# Patient Record
Sex: FEMALE | Race: WHITE | NOT HISPANIC OR LATINO | Employment: OTHER | URBAN - METROPOLITAN AREA
[De-identification: names, ages, dates, MRNs, and addresses within clinical notes are randomized per-mention and may not be internally consistent; named-entity substitution may affect disease eponyms.]

---

## 2017-02-14 ENCOUNTER — OFFICE VISIT - RIVER FALLS (OUTPATIENT)
Dept: FAMILY MEDICINE | Facility: CLINIC | Age: 77
End: 2017-02-14

## 2017-02-14 ASSESSMENT — MIFFLIN-ST. JEOR: SCORE: 1179.26

## 2017-03-14 ENCOUNTER — OFFICE VISIT - RIVER FALLS (OUTPATIENT)
Dept: FAMILY MEDICINE | Facility: CLINIC | Age: 77
End: 2017-03-14

## 2017-09-06 ENCOUNTER — AMBULATORY - RIVER FALLS (OUTPATIENT)
Dept: FAMILY MEDICINE | Facility: CLINIC | Age: 77
End: 2017-09-06

## 2017-09-07 LAB
CHOLEST SERPL-MCNC: 273 MG/DL
CHOLEST/HDLC SERPL: 3.3 {RATIO}
CREAT SERPL-MCNC: 0.89 MG/DL (ref 0.6–0.93)
GLUCOSE BLD-MCNC: 102 MG/DL (ref 65–99)
HDLC SERPL-MCNC: 83 MG/DL
LDLC SERPL CALC-MCNC: 168 MG/DL
NONHDLC SERPL-MCNC: 190 MG/DL
TRIGL SERPL-MCNC: 102 MG/DL

## 2017-09-13 ENCOUNTER — OFFICE VISIT - RIVER FALLS (OUTPATIENT)
Dept: FAMILY MEDICINE | Facility: CLINIC | Age: 77
End: 2017-09-13

## 2017-09-13 ASSESSMENT — MIFFLIN-ST. JEOR: SCORE: 1194.12

## 2018-02-06 ENCOUNTER — AMBULATORY - RIVER FALLS (OUTPATIENT)
Dept: FAMILY MEDICINE | Facility: CLINIC | Age: 78
End: 2018-02-06

## 2018-02-07 LAB
CHOLEST SERPL-MCNC: 166 MG/DL
CHOLEST/HDLC SERPL: 2.2 {RATIO}
HDLC SERPL-MCNC: 75 MG/DL
LDLC SERPL CALC-MCNC: 77 MG/DL
NONHDLC SERPL-MCNC: 91 MG/DL
TRIGL SERPL-MCNC: 68 MG/DL

## 2018-02-13 ENCOUNTER — OFFICE VISIT - RIVER FALLS (OUTPATIENT)
Dept: FAMILY MEDICINE | Facility: CLINIC | Age: 78
End: 2018-02-13

## 2018-03-15 ENCOUNTER — OFFICE VISIT - RIVER FALLS (OUTPATIENT)
Dept: FAMILY MEDICINE | Facility: CLINIC | Age: 78
End: 2018-03-15

## 2018-03-29 ENCOUNTER — OFFICE VISIT - RIVER FALLS (OUTPATIENT)
Dept: FAMILY MEDICINE | Facility: CLINIC | Age: 78
End: 2018-03-29

## 2018-03-30 LAB
CREAT SERPL-MCNC: 0.98 MG/DL (ref 0.6–0.93)
GLUCOSE BLD-MCNC: 87 MG/DL (ref 65–99)

## 2018-05-03 ENCOUNTER — OFFICE VISIT - RIVER FALLS (OUTPATIENT)
Dept: FAMILY MEDICINE | Facility: CLINIC | Age: 78
End: 2018-05-03

## 2018-07-25 ENCOUNTER — OFFICE VISIT - RIVER FALLS (OUTPATIENT)
Dept: FAMILY MEDICINE | Facility: CLINIC | Age: 78
End: 2018-07-25

## 2018-09-19 ENCOUNTER — OFFICE VISIT - RIVER FALLS (OUTPATIENT)
Dept: FAMILY MEDICINE | Facility: CLINIC | Age: 78
End: 2018-09-19

## 2019-11-07 ENCOUNTER — AMBULATORY - RIVER FALLS (OUTPATIENT)
Dept: FAMILY MEDICINE | Facility: CLINIC | Age: 79
End: 2019-11-07

## 2019-11-08 LAB
BUN SERPL-MCNC: 14 MG/DL (ref 7–25)
BUN/CREAT RATIO - HISTORICAL: 14 (ref 6–22)
CALCIUM SERPL-MCNC: 9.4 MG/DL (ref 8.6–10.4)
CHLORIDE BLD-SCNC: 103 MMOL/L (ref 98–110)
CHOLEST SERPL-MCNC: 154 MG/DL
CHOLEST/HDLC SERPL: 2.3 {RATIO}
CO2 SERPL-SCNC: 27 MMOL/L (ref 20–32)
CREAT SERPL-MCNC: 0.99 MG/DL (ref 0.6–0.88)
EGFRCR SERPLBLD CKD-EPI 2021: 54 ML/MIN/1.73M2
ERYTHROCYTE [DISTWIDTH] IN BLOOD BY AUTOMATED COUNT: 12 % (ref 11–15)
GLUCOSE BLD-MCNC: 90 MG/DL (ref 65–99)
HCT VFR BLD AUTO: 35.6 % (ref 35–45)
HDLC SERPL-MCNC: 67 MG/DL
HGB BLD-MCNC: 11.9 GM/DL (ref 11.7–15.5)
LDLC SERPL CALC-MCNC: 74 MG/DL
MCH RBC QN AUTO: 31 PG (ref 27–33)
MCHC RBC AUTO-ENTMCNC: 33.4 GM/DL (ref 32–36)
MCV RBC AUTO: 92.7 FL (ref 80–100)
NONHDLC SERPL-MCNC: 87 MG/DL
PLATELET # BLD AUTO: 307 10*3/UL (ref 140–400)
PMV BLD: 10 FL (ref 7.5–12.5)
POTASSIUM BLD-SCNC: 4.2 MMOL/L (ref 3.5–5.3)
RBC # BLD AUTO: 3.84 10*6/UL (ref 3.8–5.1)
SODIUM SERPL-SCNC: 137 MMOL/L (ref 135–146)
TRIGL SERPL-MCNC: 45 MG/DL
WBC # BLD AUTO: 6.3 10*3/UL (ref 3.8–10.8)

## 2019-11-19 ENCOUNTER — OFFICE VISIT - RIVER FALLS (OUTPATIENT)
Dept: FAMILY MEDICINE | Facility: CLINIC | Age: 79
End: 2019-11-19

## 2019-12-05 ENCOUNTER — OFFICE VISIT - RIVER FALLS (OUTPATIENT)
Dept: FAMILY MEDICINE | Facility: CLINIC | Age: 79
End: 2019-12-05

## 2020-09-30 ENCOUNTER — AMBULATORY - RIVER FALLS (OUTPATIENT)
Dept: FAMILY MEDICINE | Facility: CLINIC | Age: 80
End: 2020-09-30
Payer: COMMERCIAL

## 2020-11-20 ENCOUNTER — COMMUNICATION - RIVER FALLS (OUTPATIENT)
Dept: FAMILY MEDICINE | Facility: CLINIC | Age: 80
End: 2020-11-20
Payer: COMMERCIAL

## 2020-11-25 ENCOUNTER — AMBULATORY - RIVER FALLS (OUTPATIENT)
Dept: FAMILY MEDICINE | Facility: CLINIC | Age: 80
End: 2020-11-25
Payer: COMMERCIAL

## 2020-11-26 LAB
BUN SERPL-MCNC: 17 MG/DL (ref 7–25)
BUN/CREAT RATIO - HISTORICAL: 18 (ref 6–22)
CALCIUM SERPL-MCNC: 9.6 MG/DL (ref 8.6–10.4)
CHLORIDE BLD-SCNC: 102 MMOL/L (ref 98–110)
CHOLEST SERPL-MCNC: 177 MG/DL
CHOLEST/HDLC SERPL: 2.6 {RATIO}
CO2 SERPL-SCNC: 27 MMOL/L (ref 20–32)
CREAT SERPL-MCNC: 0.94 MG/DL (ref 0.6–0.88)
EGFRCR SERPLBLD CKD-EPI 2021: 57 ML/MIN/1.73M2
ERYTHROCYTE [DISTWIDTH] IN BLOOD BY AUTOMATED COUNT: 12.1 % (ref 11–15)
GLUCOSE BLD-MCNC: 97 MG/DL (ref 65–99)
HCT VFR BLD AUTO: 38.3 % (ref 35–45)
HDLC SERPL-MCNC: 67 MG/DL
HGB BLD-MCNC: 13.4 GM/DL (ref 11.7–15.5)
LDLC SERPL CALC-MCNC: 93 MG/DL
MCH RBC QN AUTO: 32.4 PG (ref 27–33)
MCHC RBC AUTO-ENTMCNC: 35 GM/DL (ref 32–36)
MCV RBC AUTO: 92.5 FL (ref 80–100)
NONHDLC SERPL-MCNC: 110 MG/DL
PLATELET # BLD AUTO: 287 10*3/UL (ref 140–400)
PMV BLD: 10.7 FL (ref 7.5–12.5)
POTASSIUM BLD-SCNC: 4.3 MMOL/L (ref 3.5–5.3)
RBC # BLD AUTO: 4.14 10*6/UL (ref 3.8–5.1)
SODIUM SERPL-SCNC: 136 MMOL/L (ref 135–146)
TRIGL SERPL-MCNC: 77 MG/DL
WBC # BLD AUTO: 7.3 10*3/UL (ref 3.8–10.8)

## 2020-11-27 ENCOUNTER — COMMUNICATION - RIVER FALLS (OUTPATIENT)
Dept: FAMILY MEDICINE | Facility: CLINIC | Age: 80
End: 2020-11-27
Payer: COMMERCIAL

## 2020-12-03 ENCOUNTER — TRANSFERRED RECORDS (OUTPATIENT)
Dept: MULTI SPECIALTY CLINIC | Facility: CLINIC | Age: 80
End: 2020-12-03
Payer: COMMERCIAL

## 2020-12-03 ENCOUNTER — OFFICE VISIT - RIVER FALLS (OUTPATIENT)
Dept: FAMILY MEDICINE | Facility: CLINIC | Age: 80
End: 2020-12-03
Payer: COMMERCIAL

## 2020-12-03 ASSESSMENT — MIFFLIN-ST. JEOR: SCORE: 1183.23

## 2021-01-14 ENCOUNTER — OFFICE VISIT - RIVER FALLS (OUTPATIENT)
Dept: FAMILY MEDICINE | Facility: CLINIC | Age: 81
End: 2021-01-14
Payer: COMMERCIAL

## 2021-01-14 ASSESSMENT — MIFFLIN-ST. JEOR: SCORE: 1192.3

## 2021-06-01 ENCOUNTER — COMMUNICATION - RIVER FALLS (OUTPATIENT)
Dept: FAMILY MEDICINE | Facility: CLINIC | Age: 81
End: 2021-06-01
Payer: COMMERCIAL

## 2021-06-02 ENCOUNTER — OFFICE VISIT - RIVER FALLS (OUTPATIENT)
Dept: FAMILY MEDICINE | Facility: CLINIC | Age: 81
End: 2021-06-02
Payer: COMMERCIAL

## 2021-08-20 ENCOUNTER — COMMUNICATION - RIVER FALLS (OUTPATIENT)
Dept: FAMILY MEDICINE | Facility: CLINIC | Age: 81
End: 2021-08-20
Payer: COMMERCIAL

## 2021-09-01 ENCOUNTER — AMBULATORY - RIVER FALLS (OUTPATIENT)
Dept: FAMILY MEDICINE | Facility: CLINIC | Age: 81
End: 2021-09-01
Payer: COMMERCIAL

## 2021-09-02 LAB
BUN SERPL-MCNC: 18 MG/DL (ref 7–25)
BUN/CREAT RATIO - HISTORICAL: 20 (ref 6–22)
CALCIUM SERPL-MCNC: 9.8 MG/DL (ref 8.6–10.4)
CHLORIDE BLD-SCNC: 103 MMOL/L (ref 98–110)
CHOLEST SERPL-MCNC: 177 MG/DL
CHOLEST/HDLC SERPL: 2.5 {RATIO}
CO2 SERPL-SCNC: 27 MMOL/L (ref 20–32)
CREAT SERPL-MCNC: 0.89 MG/DL (ref 0.6–0.88)
EGFRCR SERPLBLD CKD-EPI 2021: 61 ML/MIN/1.73M2
ERYTHROCYTE [DISTWIDTH] IN BLOOD BY AUTOMATED COUNT: 12.3 % (ref 11–15)
GLUCOSE BLD-MCNC: 94 MG/DL (ref 65–99)
HCT VFR BLD AUTO: 38.4 % (ref 35–45)
HDLC SERPL-MCNC: 71 MG/DL
HGB BLD-MCNC: 12.7 GM/DL (ref 11.7–15.5)
LDLC SERPL CALC-MCNC: 91 MG/DL
MCH RBC QN AUTO: 31.2 PG (ref 27–33)
MCHC RBC AUTO-ENTMCNC: 33.1 GM/DL (ref 32–36)
MCV RBC AUTO: 94.3 FL (ref 80–100)
NONHDLC SERPL-MCNC: 106 MG/DL
PLATELET # BLD AUTO: 315 10*3/UL (ref 140–400)
PMV BLD: 10.2 FL (ref 7.5–12.5)
POTASSIUM BLD-SCNC: 4.9 MMOL/L (ref 3.5–5.3)
RBC # BLD AUTO: 4.07 10*6/UL (ref 3.8–5.1)
SODIUM SERPL-SCNC: 137 MMOL/L (ref 135–146)
TRIGL SERPL-MCNC: 61 MG/DL
WBC # BLD AUTO: 7.4 10*3/UL (ref 3.8–10.8)

## 2021-09-07 ENCOUNTER — COMMUNICATION - RIVER FALLS (OUTPATIENT)
Dept: FAMILY MEDICINE | Facility: CLINIC | Age: 81
End: 2021-09-07
Payer: COMMERCIAL

## 2021-09-10 ENCOUNTER — OFFICE VISIT - RIVER FALLS (OUTPATIENT)
Dept: FAMILY MEDICINE | Facility: CLINIC | Age: 81
End: 2021-09-10
Payer: COMMERCIAL

## 2021-09-10 ASSESSMENT — MIFFLIN-ST. JEOR: SCORE: 1166.22

## 2021-09-13 ENCOUNTER — COMMUNICATION - RIVER FALLS (OUTPATIENT)
Dept: FAMILY MEDICINE | Facility: CLINIC | Age: 81
End: 2021-09-13
Payer: COMMERCIAL

## 2021-09-27 ENCOUNTER — COMMUNICATION - RIVER FALLS (OUTPATIENT)
Dept: FAMILY MEDICINE | Facility: CLINIC | Age: 81
End: 2021-09-27
Payer: COMMERCIAL

## 2021-10-04 ENCOUNTER — AMBULATORY - RIVER FALLS (OUTPATIENT)
Dept: FAMILY MEDICINE | Facility: CLINIC | Age: 81
End: 2021-10-04
Payer: COMMERCIAL

## 2021-11-11 ENCOUNTER — AMBULATORY - RIVER FALLS (OUTPATIENT)
Dept: FAMILY MEDICINE | Facility: CLINIC | Age: 81
End: 2021-11-11
Payer: COMMERCIAL

## 2021-12-17 ENCOUNTER — COMMUNICATION - RIVER FALLS (OUTPATIENT)
Dept: FAMILY MEDICINE | Facility: CLINIC | Age: 81
End: 2021-12-17
Payer: COMMERCIAL

## 2021-12-17 ENCOUNTER — OFFICE VISIT - RIVER FALLS (OUTPATIENT)
Dept: FAMILY MEDICINE | Facility: CLINIC | Age: 81
End: 2021-12-17
Payer: COMMERCIAL

## 2021-12-17 ASSESSMENT — MIFFLIN-ST. JEOR: SCORE: 1170.76

## 2021-12-22 ENCOUNTER — OFFICE VISIT - RIVER FALLS (OUTPATIENT)
Dept: FAMILY MEDICINE | Facility: CLINIC | Age: 81
End: 2021-12-22
Payer: COMMERCIAL

## 2021-12-22 ASSESSMENT — MIFFLIN-ST. JEOR: SCORE: 1179.37

## 2021-12-27 ENCOUNTER — COMMUNICATION - RIVER FALLS (OUTPATIENT)
Dept: FAMILY MEDICINE | Facility: CLINIC | Age: 81
End: 2021-12-27
Payer: COMMERCIAL

## 2022-01-19 ENCOUNTER — OFFICE VISIT - RIVER FALLS (OUTPATIENT)
Dept: FAMILY MEDICINE | Facility: CLINIC | Age: 82
End: 2022-01-19
Payer: COMMERCIAL

## 2022-02-11 VITALS
HEIGHT: 64 IN | BODY MASS INDEX: 27.49 KG/M2 | HEART RATE: 61 BPM | DIASTOLIC BLOOD PRESSURE: 80 MMHG | TEMPERATURE: 98 F | RESPIRATION RATE: 14 BRPM | WEIGHT: 161 LBS | OXYGEN SATURATION: 97 % | SYSTOLIC BLOOD PRESSURE: 144 MMHG

## 2022-02-11 VITALS
DIASTOLIC BLOOD PRESSURE: 70 MMHG | HEART RATE: 78 BPM | DIASTOLIC BLOOD PRESSURE: 70 MMHG | SYSTOLIC BLOOD PRESSURE: 140 MMHG | TEMPERATURE: 98 F | WEIGHT: 164.4 LBS | HEART RATE: 64 BPM | SYSTOLIC BLOOD PRESSURE: 150 MMHG | SYSTOLIC BLOOD PRESSURE: 124 MMHG | TEMPERATURE: 97.3 F | DIASTOLIC BLOOD PRESSURE: 68 MMHG | WEIGHT: 163.8 LBS | HEART RATE: 35 BPM

## 2022-02-11 VITALS
BODY MASS INDEX: 27.31 KG/M2 | BODY MASS INDEX: 27.64 KG/M2 | SYSTOLIC BLOOD PRESSURE: 145 MMHG | HEIGHT: 64 IN | WEIGHT: 160 LBS | DIASTOLIC BLOOD PRESSURE: 119 MMHG | HEART RATE: 51 BPM | WEIGHT: 161.9 LBS | HEIGHT: 64 IN | OXYGEN SATURATION: 95 % | TEMPERATURE: 97.4 F | SYSTOLIC BLOOD PRESSURE: 226 MMHG | DIASTOLIC BLOOD PRESSURE: 78 MMHG | RESPIRATION RATE: 20 BRPM | OXYGEN SATURATION: 97 % | HEART RATE: 73 BPM

## 2022-02-11 VITALS
TEMPERATURE: 97.2 F | OXYGEN SATURATION: 99 % | DIASTOLIC BLOOD PRESSURE: 72 MMHG | HEART RATE: 76 BPM | WEIGHT: 160.4 LBS | SYSTOLIC BLOOD PRESSURE: 148 MMHG | BODY MASS INDEX: 27.11 KG/M2

## 2022-02-11 VITALS
HEART RATE: 64 BPM | WEIGHT: 161 LBS | DIASTOLIC BLOOD PRESSURE: 68 MMHG | TEMPERATURE: 96.5 F | HEIGHT: 65 IN | HEIGHT: 65 IN | BODY MASS INDEX: 26.82 KG/M2 | DIASTOLIC BLOOD PRESSURE: 72 MMHG | TEMPERATURE: 96.7 F | HEART RATE: 64 BPM | OXYGEN SATURATION: 95 % | BODY MASS INDEX: 27.16 KG/M2 | SYSTOLIC BLOOD PRESSURE: 172 MMHG | WEIGHT: 163 LBS | SYSTOLIC BLOOD PRESSURE: 170 MMHG | OXYGEN SATURATION: 96 %

## 2022-02-11 VITALS
SYSTOLIC BLOOD PRESSURE: 120 MMHG | OXYGEN SATURATION: 94 % | HEART RATE: 65 BPM | TEMPERATURE: 97.7 F | DIASTOLIC BLOOD PRESSURE: 78 MMHG

## 2022-02-11 VITALS
HEIGHT: 65 IN | DIASTOLIC BLOOD PRESSURE: 72 MMHG | BODY MASS INDEX: 27.22 KG/M2 | TEMPERATURE: 97.6 F | SYSTOLIC BLOOD PRESSURE: 144 MMHG | WEIGHT: 163.4 LBS | HEART RATE: 64 BPM

## 2022-02-11 VITALS
SYSTOLIC BLOOD PRESSURE: 166 MMHG | TEMPERATURE: 97.9 F | HEART RATE: 76 BPM | WEIGHT: 163 LBS | DIASTOLIC BLOOD PRESSURE: 82 MMHG

## 2022-02-11 VITALS
HEIGHT: 64 IN | SYSTOLIC BLOOD PRESSURE: 136 MMHG | TEMPERATURE: 97.1 F | HEART RATE: 65 BPM | BODY MASS INDEX: 27.14 KG/M2 | OXYGEN SATURATION: 96 % | DIASTOLIC BLOOD PRESSURE: 88 MMHG | WEIGHT: 159 LBS

## 2022-02-11 VITALS
DIASTOLIC BLOOD PRESSURE: 80 MMHG | BODY MASS INDEX: 27.08 KG/M2 | WEIGHT: 159 LBS | TEMPERATURE: 97.6 F | HEART RATE: 64 BPM | SYSTOLIC BLOOD PRESSURE: 180 MMHG

## 2022-02-16 NOTE — PROGRESS NOTES
"   Patient:   SURJIT BOURNE            MRN: [id number]            FIN: [id number]               Age:   81 years     Sex:  Female     :  1940   Associated Diagnoses:   Iron deficiency anemia; Esophageal Reflux Disease; Osteoporosis; Hypertension   Author:   Mg Aguiar MD      Visit Information      Date of Service: 2021 12:49 pm  Performing Location: [facility]  Encounter#: [id number]      Primary Care Provider (PCP):  Mg Aguiar MD    NPI# [id number]      Referring Provider:  Mg Aguiar MD    NPI# [id number]      Chief Complaint   2021 1:03 PM CST   1.  f/u HTN/\"spells\" - describes having difficulty walking straight at times, difficutly ocntrollijng her body, visutal disturbance              Additional Information:No additional information recorded during visit.   Chief complaint and symptoms as noted above and confirmed with patient.  Recent lab and diagnostic studies reviewed with patient      History of Present Illness     12/3/2020: Surjit presents for annual wellness evaluation.  She and Rohan are doing well.  They are not planning on traveling to [place] this year because of the pandemic.  She and her family have been very careful with isolation.  She has been wearing a mask in all indoor occasions.  She and Himanshu have been healthy.  Blood pressure remains consistently high.  She is hesitant about introduction of medications though after further conversation is willing to trial further medicine.  She did not receive Reclast this year though would be willing to do it now.  21:Jo Ann returns to clinic for blood pressure follow-up.  At her last visit was started on losartan 50 mg daily due to persistent uncontrolled hypertension.  She has tolerated the medications without any real issues.  She does monitor her home blood pressures.  States before taking the medication, and her home systolic blood pressures were generally in the 150s.  She brings in a log showing home " blood pressures now primarily in the 120s and 130s.  Heart rates per home recordings generally in the 60s.  Some concern with automated blood pressure cuff reporting a pulse rate of 35.  Complaints of persistent cough, questions of its food related.    12/22/2021: Jo Ann returns for follow-up related to hypertension concerns.  Seen in urgent care clinic this past week related to acute onset ataxia and dizziness base complaints.  In clinic EKG not demonstrating any acute LV strain or arrhythmia.  Blood pressures notably high in the 190s over 90s.  Introduced to atenolol 25 mg daily.  She states the following day her ataxia complaints spontaneously resolved and felt more like baseline.  Has not had any development of similar episodes since.  Does not describe any focal neurological deficits.  Denies any weakness, paresthesias, visual changes.  No medication changes.  Denies any headache.             Review of Systems   Constitutional:  No fever, No chills, No weakness, No fatigue.    Eye:  Negative except as documented in history of present illness.    Ear/Nose/Mouth/Throat:  No nasal congestion.    Respiratory:  No shortness of breath, No cough.    Cardiovascular:  No chest pain, No palpitations, No peripheral edema, No syncope.    Gastrointestinal:  No nausea, No vomiting, No abdominal pain.    Genitourinary:  No dysuria, No hematuria.    Hematology/Lymphatics:  Negative except as documented in history of present illness.    Endocrine   Immunologic   Musculoskeletal:  No joint pain, No muscle pain.    Integumentary   Neurologic:  Alert and oriented X4, Abnormal balance, No confusion, No numbness, No tingling, No headache.       Health Status   Allergies:    Allergic Reactions (Selected)  Severity Not Documented  Acetaminophen (Nausea and vomiting)  Aciphex (Upset stomach)  Codeine (Vomit)  HYDROcodone (Nausea and vomiting)  TraMADol (Vomit)   Medications:  (Selected)    Prescriptions  Prescribed  atenolol-chlorthalidone 50 mg-25 mg oral tablet: 1 tab(s), Oral, daily, # 90 tab(s), 3 Refill(s), Type: Maintenance, Pharmacy: [facility] [id number] [facility], 1 tab(s) Oral daily, 64, in, 12/22/21 13:03:00 CST, Height Measured, 161.9, lb, 12/22/21 13:03:00 CST, Weight Measured  atorvastatin 20 mg oral tablet: = 1 tab(s), Oral, daily, # 90 tab(s), 2 Refill(s), Type: Maintenance, Pharmacy: [place] [facility] [id number] [facility], Pt due for annual med check for further refills., 1 tab(s) Oral daily, 64, in, 09/10/21 11:20:00 CDT, Height Measured, 159, lb, 09/10/21 11:20:00 CDT,...  calcium carbonate 500 mg (200 mg elemental calcium) oral tablet, chewable: = 1 tab(s) ( 500 mg ), Chewed, bid, # 120 tab(s), 0 Refill(s), Type: Maintenance, OTC (Rx)  losartan 50 mg oral tablet: = 1 tab(s) ( 50 mg ), Oral, daily, # 90 tab(s), 2 Refill(s), Type: Maintenance, Pharmacy: [facility] [id number] [facility], 1 tab(s) Oral daily, 64, in, 09/10/21 11:20:00 CDT, Height Measured, 159, lb, 09/10/21 11:20:00 CDT, Weight Measured  omeprazole 20 mg oral delayed release capsule: = 1 cap(s) ( 20 mg ), Oral, daily, # 90 cap(s), 2 Refill(s), Type: Maintenance, Pharmacy: [facility] [id number] [facility], keep on file and pt will notify when needed, 1 cap(s) Oral daily, 64, in, 09/10/21 11:20:00 CDT, Height Measured, 159, lb, 09/10/21 11:20:00...  Documented Medications  Documented  Reclast 5 mg/100 mL intravenous solution: ( 5 mg ), IV, once, Instructions: annually, 0 Refill(s), Type: Maintenance  ibuprofen: po, prn, PRN: as needed for pain, 0 Refill(s), Type: Maintenance,    Medications          *denotes recorded medication          atenolol-chlorthalidone 50 mg-25 mg oral tablet: 1 tab(s), Oral, daily, 90 tab(s), 3 Refill(s).          atorvastatin 20 mg oral tablet: 1 tab(s), Oral, daily, 90 tab(s), 2 Refill(s).          calcium carbonate 500 mg (200 mg elemental calcium) oral tablet, chewable: 500 mg, 1 tab(s), Chewed, bid, 120 tab(s), 0 Refill(s).          *ibuprofen:  po, prn, PRN: as needed for pain, 0 Refill(s).          losartan 50 mg oral tablet: 50 mg, 1 tab(s), Oral, daily, 90 tab(s), 2 Refill(s).          omeprazole 20 mg oral delayed release capsule: 20 mg, 1 cap(s), Oral, daily, 90 cap(s), 2 Refill(s).          *Reclast 5 mg/100 mL intravenous solution: 5 mg, IV, once, annually, 0 Refill(s).       Problem list:    All Problems  Blood pressure elevated / SNOMED CT 069354721 / Confirmed  Esophageal reflux disease / SNOMED CT 727374827 / Confirmed  History of chickenpox / SNOMED CT 318302776 / Confirmed  Hypertension / SNOMED CT 6522734504 / Confirmed  Iron deficiency anemia / SNOMED CT 676751179 / Confirmed  Obesity / SNOMED CT 4705982680 / Probable  OA (osteoarthritis) / SNOMED CT 3687814330 / Confirmed  Osteoporosis / SNOMED CT 391050915 / Confirmed  Pure hypercholesterolemia / SNOMED CT 302212237 / Confirmed  Inactive: Thyroid nodule / SNOMED CT 813549550  Resolved: Acute lower UTI (urinary tract infection) / SNOMED CT 1798269290  Resolved: Bilateral carpal tunnel syndrome / SNOMED CT 56307353  Resolved: Head trauma / SNOMED CT 173167735  Resolved: Pregnancy / SNOMED CT 850024469  Resolved: Pregnancy / SNOMED CT 425480073  Resolved: Pregnancy / SNOMED CT 503250148  Resolved: Seizures / SNOMED CT 954975101  Canceled: Obesity / ICD-9-.00      Histories   Past Medical History:    Active  OA (osteoarthritis) (9140335201): Onset in 2006 at 66 years.  Comments:  12/19/2012 CST 1:20 PM CST - Gina Padilla  Right knee  Esophageal reflux disease (575680583)  Obesity (1718531348)  Blood pressure elevated (107263283)  Iron deficiency anemia (778157800)  History of chickenpox (292139170)  Resolved  Seizures (909539198): Onset in 1960 at 20 years.  Resolved.  Comments:  12/20/2012 CST 10:13 AM CST - Davina Santana  Of unknown origin.  Acute lower UTI (urinary tract infection) (5243226634):  Resolved.  Bilateral carpal tunnel syndrome (24047591):  Resolved.  Head trauma  (152075189):  Resolved.  Comments:  2014 CST 3:38 PM DENICE BernsteinDavina pena  Fell back on ice with head wound.  No sequelae.  Pregnancy (053765838):  Resolved in  at 26 years.  Pregnancy (318632824):  Resolved in  at 28 years.  Pregnancy (760922967):  Resolved in  at 30 years.   Family History:    Cancer  Father ()  Comments:  2012 10:08 AM DENICE Santana  Davina  Prostate cancer.    2010 1:33 PM Angelique Montemayor  Stomach, Prostate cancer  Asthma  Father ()  Breast cancer  Mother ()  Sister (Guadalupe)  Sister (Nereida)  Hypertension  Mother ()  High blood pressure  Sister (Guadalupe)  Sister (Nereida)  Arthritis  Father ()  Mother ()  Congestive heart failure  Mother ()  Comments:  2012 10:18 AM DENICE Santana Davina  Cunningham's lung.  Seasonal allergy  Daughter  Son (Faraz)     Procedure history:    Release of trigger finger (209111628) in the month of 2020 at 79 Years.  Comments:  12/3/2020 10:43 AM CST - Chelo Buchanan CMA  left finger  Colonoscopy (220064509) in the month of 10/2014 at 73 Years.  Comments:  2016 12:58 PM CDT - Chelo Buchanan CMA  few diverticula, otherwise normal  Upper GI endoscopy (2689699717) in the month of 10/2014 at 73 Years.  Bone density scan (012405856) in the month of 2009 at 68 Years.  Cataract extraction (62948056) in  at 66 Years.  Comments:  2010 1:30 PM Angelique Montemayor 2006. R. 2007  Colonoscopy (081955304) on 10/6/2005 at 64 Years.  Comments:  2012 10:03 AM CST Chelo Storm CMA  Normal - no polyps/no masses  Excision left cheek. on 2005 at 64 Years.  excision of thyroid nodule in the month of 2001 at 60 Years.  Vaginal hysterectomy (264941859) in the month of 1998 at 58 Years.  Cholecystectomy (06602017) in  at 42 Years.  Childbirth (3851449867).  Comments:  2010 1:31 PM DENICE - Angelique Singleton 3, Para 3  Blepharoplasty of upper eyelid  "([id number]).  Comments:  12/19/2012 1:19 PM CST - Ozzyalexa Gina  Ptosis  Hysterectomy (436015940).  Comments:  11/18/2014 3:37 PM CST - Davina Santana  With one ovary remaining.  Release of trigger thumb (173585174).  Comments:  9/13/2017 1:04 PM CDT - Dewayne SCHILLINGChelo  right   Social History:        Electronic Cigarette/Vaping Assessment            Electronic Cigarette Use: Never.      Alcohol Assessment            Past      Tobacco Assessment            Never (less than 100 in lifetime)      Substance Abuse Assessment            Never      Employment and Education Assessment            Work/School description: teacher.            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      Home and Environment Assessment            Marital status: .  Spouse/Partner name: Himanshu.      Nutrition and Health Assessment            Type of diet: Regular.      Exercise and Physical Activity Assessment                     Comments:                      09/14/2017 - Chelo Gonzales                     Walking, biking, \"some leg and back weights\".  Five times per week.      Other Assessment            Marital status,         Physical Examination   vital signs stable, as noted above   Vital Signs   12/22/2021 1:11 PM CST Systolic Blood Pressure 205 mmHg  HI    Diastolic Blood Pressure 82 mmHg  HI    Mean Arterial Pressure 123 mmHg   12/22/2021 1:09 PM CST Systolic Blood Pressure 204 mmHg  HI    Systolic Blood Pressure 205 mmHg  HI    Diastolic Blood Pressure 86 mmHg  HI    Diastolic Blood Pressure 82 mmHg  HI    Mean Arterial Pressure 125 mmHg    Mean Arterial Pressure 123 mmHg    BP Site Left arm    BP Site Right arm   12/22/2021 1:03 PM CST Temperature Tympanic 97.4 DegF  LOW    Peripheral Pulse Rate 60 bpm    Systolic Blood Pressure 214 mmHg  HI    Diastolic Blood Pressure 84 mmHg  HI    Mean Arterial Pressure 127 mmHg    Oxygen Saturation 95 %      Measurements from flowsheet : Measurements "   12/22/2021 1:03 PM CST Height Measured - Standard 64 in    Height/Length Estimated 64.5 in    Weight Measured - Standard 161.9 lb    BSA 1.82 m2    Body Mass Index 27.79 kg/m2  HI      General:  Alert and oriented, No acute distress.    Eye:  Pupils are equal, round and reactive to light, Extraocular movements are intact, Normal conjunctiva.    HENT:  No pharyngeal erythema.    Respiratory:  Lungs are clear to auscultation, Respirations are non-labored, Breath sounds are equal.    Cardiovascular:  Normal rate, Regular rhythm, No murmur, regularly irregular rhythm.    Gastrointestinal:  Soft, Non-tender, Non-distended.    Neurologic:  Alert, Oriented, Normal sensory, Cranial Nerves II-XII are grossly intact.    Cognition and Speech:  Oriented, Speech clear and coherent.    Psychiatric:  Appropriate mood & affect.       Review / Management   Results review      Impression and Plan   Diagnosis     Iron deficiency anemia (BND51-RB D50.0).     Esophageal Reflux Disease (PZZ93-XZ K21.9).     Osteoporosis (LPB70-OB M81.0).     Hypertension (XSQ78-TN I10).         .) hypertension, uncontrolled (spurious home readings based on radial cuff)  current antihypertensive regimen: losartan 50mg qhs, Toprol XL 25mg daily (recently started)  regimen changes: stop metoprolol and replace with atenolol/chlorthalidone 50/25mg daily  intolerance: amlodipine (palpitations, bradycardia)   - concerns with more recent symptomatic hypertension; potential recent TIA features   - hold off on neurocranial imaging for now   - reassess with labs at 2-4 week follow-up  future titration/work-up plan:    - BP goal <140/90   - monitor bradycardia with beta blocker introduction    plan as previously outlined and not discussed at today's visit     .) GERD, h/o esophagitis   - grade C; EGD in '14 ([facility])   - pathology showing no evidence of Barretts esophagus   - maintained on omeprazole 20mg daily   - recommending H2 blocker for any breakthrough  symptoms    .) h/o severe iron deficiency anemia - resolved   - Hgb 6.2 ('14) - related to esophagitis, normal colonoscopy in '14   - Hgb since normalized   - monitor CBC annually    .) health maintenance   - osteoporosis based on '16 DEXA     - on calcium/vitamin D daily     - last DEXA in '19     - on Reclast; advised to schedule now   - no further breast or CRC screening advised   - mixed hyperlipidemia; high HDL and LDL    - previously recommended coronary calcium scoring - did not complete    - on atorvastatin - subsequent normalization of LDL    RTC in 4 weeks

## 2022-02-16 NOTE — CARE COORDINATION
Pt appears on [facility] chronic disease panel as out of parameters for elevated BP of 170/80. No further CC f/u needed at this time per [facility] note.     hypertension, controlled (whitecoat component) - not wanting to pursue further therapies for now  current antihypertensive regimen: none  regimen changes: none  intolerance: amlodipine (palpitations, bradycardia)  future titration/work-up plan:    - BP goal <140/90    Michelle De La Cruz CMA

## 2022-02-16 NOTE — TELEPHONE ENCOUNTER
---------------------  From: Joy Yeboah CMA (Unit 2 Pool ([id number]_[facility]) )   To: [facility] Message Pool ([id number]_[place] [facility] [place]);     Sent: 12/27/2021 2:36:39 PM CST  Subject: css bp ck     Pt came to the clinic for a CSS bp ck      2:15      145/78   pulse  51    started new BP meds recently---------------------  From: Chelo Buchanan CMA ([facility] Message Pool ([id number]_[facility]))   To: Mg Aguiar MD;     Sent: 12/28/2021 6:34:10 AM CST  Subject: FW: [facility] bp ck

## 2022-02-16 NOTE — TELEPHONE ENCOUNTER
Entered by Mariah Sapp CMA on December 17, 2020 8:19:11 AM CST  ---------------------  From: Mariah Sapp CMA   To: [facility] [id number] [facility]    Sent: 12/17/2020 8:19:11 AM CST  Subject: Medication Management     ** Not Approved: Patient has requested refill too soon, #90, 3 sent 12/3/20 **  omeprazole (OMEPRAZOLE DR 20 MG CAPSULE)  TAKE 1 CAPSULE BY MOUTH EVERY DAY  Qty:  90 cap(s)        Days Supply:  90        Refills:  3          Substitutions Allowed     Route To Pharmacy - [place] [id number] [facility]   Signed by Mariah Sapp CMA            ------------------------------------------  From: [facility] [id number] [facility]  To: Mg Aguiar MD  Sent: December 17, 2020 4:18:24 AM CST  Subject: Medication Management  Due: December 16, 2020 8:25:29 PM CST     ** On Hold Pending Signature **     Dispensed Drug: omeprazole (omeprazole 20 mg oral delayed release capsule), TAKE 1 CAPSULE BY MOUTH EVERY DAY  Quantity: 90 cap(s)  Days Supply: 90  Refills: 3  Substitutions Allowed  Notes from Pharmacy:  ------------------------------------------

## 2022-02-16 NOTE — NURSING NOTE
Comprehensive Intake Entered On:  11/19/2019 10:22 AM CST    Performed On:  11/19/2019 10:18 AM CST by Diane Poe CMA               Summary   Chief Complaint :   Rx refill- Omeprazole, atorvastatin    Advance Directive :   Yes   Weight Measured :   160.4 lb(Converted to: 160 lb 6 oz, 72.76 kg)    Systolic Blood Pressure :   148 mmHg (HI)    Diastolic Blood Pressure :   72 mmHg   Mean Arterial Pressure :   97 mmHg   Peripheral Pulse Rate :   76 bpm   BP Site :   Right arm   BP Method :   Manual   HR Method :   Electronic   Temperature Tympanic :   97.2 DegF(Converted to: 36.2 DegC)  (LOW)    Oxygen Saturation :   99 %   Diane Poe CMA - 11/19/2019 10:18 AM CST   Health Status   Allergies Verified? :   Yes   Medication History Verified? :   Yes   Immunizations Current :   Yes   Pre-Visit Planning Status :   Completed   Tobacco Use? :   Never smoker   Diane Poe CMA - 11/19/2019 10:18 AM CST   Consents   Consent for Immunization Exchange :   Consent Granted   Consent for Immunizations to Providers :   Consent Granted   Diane Poe CMA - 11/19/2019 10:18 AM CST   Meds / Allergies   (As Of: 11/19/2019 10:22:19 AM CST)   Allergies (Active)   acetaminophen  Estimated Onset Date:   Unspecified ; Reactions:   nausea and vomiting ; Created By:   Davina Santana; Reaction Status:   Active ; Category:   Drug ; Substance:   acetaminophen ; Type:   Allergy ; Updated By:   Davina Santana; Reviewed Date:   7/25/2018 2:55 PM CDT      Aciphex  Estimated Onset Date:   Unspecified ; Reactions:   Upset stomach ; Created By:   Gina Padilla; Reaction Status:   Active ; Category:   Drug ; Substance:   Aciphex ; Type:   Allergy ; Updated By:   Gina Padilla; Reviewed Date:   7/25/2018 2:55 PM CDT      codeine  Estimated Onset Date:   Unspecified ; Reactions:   Vomit ; Created By:   Angelique Singleton; Reaction Status:   Active ; Category:   Drug ; Substance:   codeine ; Type:   Allergy ; Updated By:   Angelique Singleton; Reviewed Date:    7/25/2018 2:55 PM CDT      HYDROcodone  Estimated Onset Date:   Unspecified ; Reactions:   nausea and vomiting ; Created By:   Davina Santana; Reaction Status:   Active ; Category:   Drug ; Substance:   HYDROcodone ; Type:   Allergy ; Updated By:   Davina Santana; Reviewed Date:   7/25/2018 2:55 PM CDT      traMADol  Estimated Onset Date:   Unspecified ; Reactions:   Vomit ; Created By:   Abhishek Song CMA; Reaction Status:   Active ; Category:   Drug ; Substance:   traMADol ; Type:   Allergy ; Updated By:   Abihshek Song CMA; Reviewed Date:   7/25/2018 2:55 PM CDT        Medication List   (As Of: 11/19/2019 10:22:19 AM CST)   Prescription/Discharge Order    atorvastatin  :   atorvastatin ; Status:   Prescribed ; Ordered As Mnemonic:   atorvastatin 20 mg oral tablet ; Simple Display Line:   1 tab(s), Oral, daily, 30 tab(s), 0 Refill(s) ; Ordering Provider:   Mg Aguiar MD; Catalog Code:   atorvastatin ; Order Dt/Tm:   9/13/2019 9:46:24 AM CDT          omeprazole  :   omeprazole ; Status:   Prescribed ; Ordered As Mnemonic:   omeprazole 20 mg oral delayed release capsule ; Simple Display Line:   20 mg, 1 cap(s), Oral, daily, 90 cap(s), 3 Refill(s) ; Ordering Provider:   Mg Aguiar MD; Catalog Code:   omeprazole ; Order Dt/Tm:   9/20/2018 12:24:17 PM CDT            Home Meds    calcium carbonate  :   calcium carbonate ; Status:   Documented ; Ordered As Mnemonic:   OsCal 500 oral tablet, chewable ; Simple Display Line:   3,750 mg, 3 tab(s), daily, contains vitamin D3 600IU ; Catalog Code:   calcium carbonate ; Order Dt/Tm:   11/22/2011 11:00:26 AM CST          ibuprofen  :   ibuprofen ; Status:   Documented ; Ordered As Mnemonic:   ibuprofen ; Simple Display Line:   po, prn, PRN: as needed for pain, 0 Refill(s) ; Catalog Code:   ibuprofen ; Order Dt/Tm:   2/14/2017 1:21:00 PM CST

## 2022-02-16 NOTE — TELEPHONE ENCOUNTER
Entered by Marely Trejo LPN on September 13, 2019 9:36:13 AM CDT  ---------------------  From: Marely Trejo LPN   To: [facility] [id number] [facility]    Sent: 9/13/2019 9:36:13 AM CDT  Subject: Medication Management     ** Submitted: **  Order:atorvastatin (atorvastatin 20 mg oral tablet)  1 tab(s)  Oral  daily  Qty:  90 tab(s)        Days Supply:  90        Refills:  3          Substitutions Allowed     Route To Pharmacy - [facility] [id number] [facility]    Signed by Marely Trejo LPN  9/13/2019 9:35:00 AM    ** Submitted: **  Complete:atorvastatin (atorvastatin 20 mg oral tablet)   Signed by Marely Trejo LPN  9/13/2019 9:36:00 AM    ** Not Approved:  **  atorvastatin (ATORVASTATIN 20 MG TABLET)  TAKE 1 TABLET BY MOUTH EVERY DAY  Qty:  90 tab(s)        Days Supply:  90        Refills:  3          Substitutions Allowed     Route To Pharmacy - [facility] [id number] [facility]   Signed by Marely Trejo LPN            ------------------------------------------  From: [facility] [id number] [facility]  To: Mg Aguiar MD  Sent: September 13, 2019 2:11:31 AM CDT  Subject: Medication Management  Due: September 14, 2019 2:11:31 AM CDT    ** On Hold Pending Signature **  Drug: atorvastatin (atorvastatin 20 mg oral tablet)  TAKE 1 TABLET BY MOUTH EVERY DAY  Quantity: 90 tab(s)     Days Supply: 90        Refills: 3  Substitutions Allowed  Notes from Pharmacy:     Dispensed Drug: atorvastatin (atorvastatin 20 mg oral tablet)  TAKE 1 TABLET BY MOUTH EVERY DAY  Quantity: 90 tab(s)     Days Supply: 90        Refills: 3  Substitutions Allowed  Notes from Pharmacy:   ------------------------------------------filled for 1 year as they were not filled at physical.Canceled script as pt is due for appointment and sent 30 day supply

## 2022-02-16 NOTE — PROGRESS NOTES
Patient:   SURJIT BOURNE            MRN: [id number]            FIN: [id number]               Age:   77 years     Sex:  Female     :  1940   Associated Diagnoses:   Iron deficiency anemia; Esophageal Reflux Disease; Osteoporosis   Author:   Mg Aguiar MD      Visit Information      Date of Service: 03/15/2018 01:11 pm  Performing Location: [facility]  Encounter#: [id number]      Primary Care Provider (PCP):  Mg Aguiar MD    NPI# [id number]      Referring Provider:  Mg Aguiar MD    NPI# [id number]      Chief Complaint   3/15/2018 1:28 PM CDT    f/u BP              Additional Information:No additional information recorded during visit.   Chief complaint and symptoms as noted above and confirmed with patient.  Recent lab and diagnostic studies reviewed with patient      History of Present Illness     2018: Jo Ann returns for general follow-up.  Overall feeling okay.  She says she feels more weak and tired than she remembers.  Asked about last available iron and hemoglobin levels from this past fall.  Denies any reflux symptoms.  No stool changes.  Generally describes having issues with swallowing pills feeling like it lodged in her throat.  No other dysphasia symptoms.  Not checking blood pressures at all at home.    3/15/2018: Return to clinic for follow-up related to her blood pressure.  She has been maintaining a home blood pressure log over the past several weeks.  Home systolic pressures typically ranging from 160 to high 140s.  Currently not on medication therapy.  She is leaving for [place] this coming week.         Review of Systems   Constitutional:  No fever, No chills, No weakness, No fatigue.    Eye:  Negative except as documented in history of present illness.    Ear/Nose/Mouth/Throat:  Negative except as documented in history of present illness.    Respiratory:  No shortness of breath, No cough.    Cardiovascular:  No chest pain, No palpitations, No peripheral edema, No  syncope.    Gastrointestinal:  No nausea, No vomiting, No abdominal pain.    Genitourinary:  No dysuria, No hematuria.    Hematology/Lymphatics:  Negative except as documented in history of present illness.    Endocrine:  No excessive thirst, No polyuria.    Immunologic:  No recurrent fevers.    Musculoskeletal:  No joint pain, No muscle pain.    Neurologic:  Alert and oriented X4, No numbness, No tingling, No headache.       Health Status   Allergies:    Allergic Reactions (Selected)  Severity Not Documented  Acetaminophen (Nausea and vomiting)  Aciphex (Upset stomach)  Codeine (Vomit)  HYDROcodone (Nausea and vomiting)  TraMADol (Vomit)   Medications:  (Selected)   Prescriptions  Prescribed  amLODIPine 5 mg oral tablet: 1 tab(s) ( 5 mg ), PO, Daily, # 90 tab(s), 3 Refill(s), Type: Maintenance, [facility]: [facility], 1 tab(s) po daily  atorvastatin 20 mg oral tablet: See Instructions, Instructions: TAKE 1 TABLET EVERY DAY, # 90 tab(s), 1 Refill(s), Type: Soft Stop, [id number], TAKE 1 TABLET EVERY DAY  omeprazole 20 mg oral delayed release capsule: 1 cap(s) ( 20 mg ), PO, Daily, # 90 cap(s), 3 Refill(s), Type: Maintenance, [id number], 1 cap(s) po daily  Documented Medications  Documented  OsCal 500 oral tablet, chewable: 3 tab(s) ( 3,750 mg ), daily, Instructions: contains vitamin D3 600IU, 0 Refill(s), Type: Maintenance  ibuprofen: po, prn, PRN: as needed for pain, 0 Refill(s), Type: Maintenance   Problem list:    All Problems  Blood pressure elevated / SNOMED CT 998532373 / Confirmed  Esophageal reflux disease / SNOMED CT 563541109 / Confirmed  History of chickenpox / SNOMED CT 646468117 / Confirmed  Iron deficiency anemia / SNOMED CT 844195178 / Confirmed  Obesity / SNOMED CT 1162044748 / Probable  OA (osteoarthritis) / SNOMED CT 1125860903 / Confirmed  Osteoporosis / SNOMED CT 566042516 / Confirmed  Pure hypercholesterolemia / SNOMED CT 671908314 /  Confirmed  Inactive: Thyroid nodule / SNOMED CT 776622827  Resolved: Acute lower UTI (urinary tract infection) / SNOMED CT 2894988936  Resolved: Bilateral carpal tunnel syndrome / SNOMED CT 40211402  Resolved: Head trauma / SNOMED CT 342200335  Resolved: Pregnancy / SNOMED CT 841090088  Resolved: Pregnancy / SNOMED CT 423260191  Resolved: Pregnancy / SNOMED CT 221094465  Resolved: Seizures / SNOMED CT 105497084  Canceled: Obesity / ICD-9-.00      Histories   Past Medical History:    Active  OA (osteoarthritis) (5572478075): Onset in  at 66 years.  Comments:  2012 CST 1:20 PM CST - Gina Padilla  Right knee  Esophageal reflux disease (152041609)  Obesity (7055142286)  Blood pressure elevated (931253103)  Iron deficiency anemia (221691372)  History of chickenpox (863619157)  Resolved  Seizures (736614832): Onset in  at 20 years.  Resolved.  Comments:  2012 CST 10:13 AM CST - Davina Santana  Of unknown origin.  Acute lower UTI (urinary tract infection) (7378612161):  Resolved.  Bilateral carpal tunnel syndrome (66415298):  Resolved.  Head trauma (668213453):  Resolved.  Comments:  2014 CST 3:38 PM CST - Max Davina  Fell back on ice with head wound.  No sequelae.  Pregnancy (298918100):  Resolved in  at 26 years.  Pregnancy (722955837):  Resolved in  at 28 years.  Pregnancy (038734893):  Resolved in  at 30 years.   Family History:    Cancer  Father ()  Comments:  2012 10:08 AM - Davina Santana  Prostate cancer.    2010 1:33 PM - Angelique Singleton  Stomach, Prostate cancer  Asthma  Father ()  Breast cancer  Mother ()  Sister (Guadalupe)  Sister (Nereida)  Hypertension  Mother ()  High blood pressure  Sister (Guadalupe)  Sister (Nereida)  Congestive heart failure  Mother ()  Comments:  2012 10:18 AM - Davina Santana  Cunningham's lung.  Seasonal allergy  Daughter  Son (Faraz)     Procedure history:    Colonoscopy (771551185) in the month of  "10/2014 at 73 Years.  Comments:  2016 12:58 PM - Chelo Buchanan CMA  few diverticula, otherwise normal  Upper GI endoscopy (3334565671) in the month of 10/2014 at 73 Years.  Bone density scan (690341114) in the month of 2009 at 68 Years.  Cataract extraction (82352104) in  at 66 Years.  Comments:  2010 1:30 PM - Angelique Singleton 2006. R. 2007  Colonoscopy (374988738) on 10/6/2005 at 64 Years.  Comments:  2012 10:03 AM - Chelo Buchanan CMA  Normal - no polyps/no masses  Excision left cheek. on 2005 at 64 Years.  excision of thyroid nodule in the month of 2001 at 60 Years.  Vaginal hysterectomy (975627630) in the month of 1998 at 58 Years.  Cholecystectomy (21822216) in  at 42 Years.  Childbirth (3578162786).  Comments:  2010 1:31 PM - Angelique Singleton   3, Para 3  Blepharoplasty of upper eyelid (63216341).  Comments:  2012 1:19 PM - Gina Padilla  Ptosis  Hysterectomy (511432526).  Comments:  2014 3:37 PM - Davina Santana  With one ovary remaining.  Release of trigger thumb (446617977).  Comments:  2017 1:04 PM - Chelo Buchanan CMA  right   Social History:        Alcohol Assessment            Past      Tobacco Assessment            Never      Substance Abuse Assessment            Never      Employment and Education Assessment            Work/School description: teacher.            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      Home and Environment Assessment            Marital status: .  Spouse/Partner name: Himanshu.      Nutrition and Health Assessment            Type of diet: Regular.      Exercise and Physical Activity Assessment                     Comments:                      2017 - Chelo Gonzales                     Walking, biking, \"some leg and back weights\".  Five times per week.      Other Assessment            Marital status,         Physical Examination   vital signs stable, as noted above   Vital " Signs   3/15/2018 1:28 PM CDT Temperature Tympanic 98 DegF    Peripheral Pulse Rate 78 bpm    Systolic Blood Pressure 178 mmHg  HI    Diastolic Blood Pressure 79 mmHg    Mean Arterial Pressure 112 mmHg    BP Site Left arm    BP Site Repeat Right arm    Vital Signs Comments Pt's BP machine - 159/97      Measurements from flowsheet : Measurements   3/15/2018 1:28 PM CDT    Weight Measured - Standard                164.4 lb     General:  Alert and oriented, No acute distress.    HENT:  Normocephalic.    Respiratory:  Lungs are clear to auscultation, Respirations are non-labored.    Cardiovascular:  Normal rate, Regular rhythm, No murmur, No edema.    Neurologic:  Alert, Oriented.    Cognition and Speech:  Oriented, Speech clear and coherent.    Psychiatric:  Appropriate mood & affect.       Review / Management   Results review:  Lab results   2/6/2018 9:23 AM CST Cholesterol 166 mg/dL    Non-HDL 91    HDL 75 mg/dL    Chol/HDL Ratio 2.2    LDL 77    Triglyceride 68 mg/dL   .       Impression and Plan   Diagnosis     Iron deficiency anemia (FUW35-IL D50.0).     Esophageal Reflux Disease (UPK35-MB K21.9).     Osteoporosis (KZX14-FX M81.0).         .) hypertension, uncontrolled  current antihypertensive regimen: none  regimen changes: begin on amlodipine 5mg daily   intolerance:  future titration/work-up plan:     plan as previously outlined:     .) GERD, esophagitis   - grade C; EGD in '14 ([facility])   - pathology showing no evidence of Barretts esophagus   - maintained on omeprazole 20mg daily   - recommending H2 blocker for any breakthrough symptoms    .) h/o severe iron deficiency anemia - resolved   - Hgb 6.2 ('14) - related to esophagitis, normal colonocopy in '14   - Hgb since normalized   - monitor CBC annually    .) health maintenance   - osteoporosis based on '16 DEXA     - on calcium/vitamin D daily     - I'd like to begin on Reclast 5mg IV annually (historical intolerance to oral bisphosphonate therapy)   -  mixed hyperlipidemia; high HDL and LDL    - previously recommended coronary calcium scoring - did not complete    RTC in 2 months for bp reassessment

## 2022-02-16 NOTE — PROGRESS NOTES
Patient:   SURJIT BOURNE            MRN: [id number]            FIN: [id number]               Age:   76 years     Sex:  Female     :  1940   Associated Diagnoses:   Hyperlipidemia; Esophageal Reflux Disease; Iron deficiency anemia; Osteoporosis   Author:   Mg Aguiar MD      Visit Information      Date of Service: 2017 12:36 pm  Performing Location: [facility]  Encounter#: [id number]      Primary Care Provider (PCP):  Mg Aguiar MD    NPI# [id number]   Visit type:  Annual exam.       Chief Complaint   2017 1:26 PM CDT    AWV     Medicare Initial / Annual Preventative Visit     HPI:          Well Adult History     ADL's and Safety were reviewed.  None found.  Please see copy of scanned form.    I have reviewed with the patient the completed health risk assessment and health history form and we have agreed on the following plans for identified risk factors. None found.  Please see copy of scanned form.    No hearing impairment, No problems with Activities of Daily Living, Not at risk for falls and Home is safe.  Patient completed  Health Risk Assessment form and Patient Health History form were reviewed with the patient and any risks identified and plans to deal with these risks are identified in the Assessment and Plan below.  Other Health Care Providers are as currently listed in the Medical Record as a Quick Note for eye care, other medical specialists, health agencies and pharmacy and medical suppliers          Well Adult History             The patient presents for well adult exam.  I've reviewed and agree with above chief complaint and comments   .        2017: Returns to clinic for annual wellness evaluation.  Overall feeling well.  Still has intermittent GERD symptoms.  Remains on omeprazole daily.  Generally opposed to the idea chronic prescription medication.  She does take calcium and vitamin D supplement.         Review of Systems   Eye:  See Preventative Services  Checklist for Vision/Glaucoma Test dates..    Ear/Nose/Mouth/Throat:  Hearing is addressed and no impairment noted..    Respiratory:  No shortness of breath.    Cardiovascular:  No chest pain.    Gastrointestinal:  No nausea, No vomiting, No diarrhea.    Genitourinary:  No dysuria.    Musculoskeletal:  No neck pain, No joint pain.    Neurologic:  Alert and oriented X4.    Psychiatric:  PHQ-9 zero.  the PHQ-9 and the CAGE assessments were reviewed and discussed with the patient.    See completed Health History for Review of Systems.      Health Status   Allergies:    Allergic Reactions (Selected)  Severity Not Documented  Acetaminophen (Nausea and vomiting)  Aciphex (Upset stomach)  Codeine (Vomit)  HYDROcodone (Nausea and vomiting)  TraMADol (Vomit)   Medications:  (Selected)   Prescriptions  Prescribed  omeprazole 20 mg oral delayed release capsule: 1 cap(s) ( 20 mg ), PO, Daily, # 90 cap(s), 3 Refill(s), Type: Maintenance, [id number], 1 cap(s) po daily  Documented Medications  Documented  OsCal 500 oral tablet, chewable: 3 tab(s) ( 3,750 mg ), daily, Instructions: contains vitamin D3 600IU, 0 Refill(s), Type: Maintenance  ibuprofen: po, prn, PRN: as needed for pain, 0 Refill(s), Type: Maintenance   Problem list:    All Problems  Blood Pressure Elevated / ICD-9-.2 / Confirmed  Esophageal Reflux Disease / ICD-9-.81 / Confirmed  Iron deficiency anemia / SNOMED CT 388868115 / Confirmed  OA (Osteoarthritis) / ICD-9-.90 / Confirmed  Obesity / ICD-9-.00 / Probable  Osteoporosis / SNOMED CT 361267226 / Confirmed  Pure hypercholesterolemia / SNOMED CT 235408640 / Confirmed  Inactive: Thyroid nodule / ICD-9-.0  Resolved: Bilateral carpal tunnel syndrome / SNOMED CT 93103476  Resolved: Head trauma / SNOMED CT 157447040  Resolved: Seizures / ICD-9-.39  Resolved: UTI (Lower Urinary Tract Infection) / ICD-9-.0  Canceled: Obesity / ICD-9-.00     Current Providers  and Suppliers Noted in Demographic Area.      Histories   Past Medical History:    Active  OA (Osteoarthritis) (715.90): Onset in  at 65 years.  Comments:  2012 CST 1:20 PM CST - meseret  Gina  Right knee  Esophageal Reflux Disease (530.81)  Iron deficiency anemia (769968953)  Resolved  Seizures (780.39): Onset in  at 20 years.  Resolved.  Comments:  2012 CST 10:13 AM CST - Davina Santana  Of unknown origin.  UTI (Lower Urinary Tract Infection) (599.0):  Resolved.  Bilateral carpal tunnel syndrome (68946066):  Resolved.  Head trauma (112101134):  Resolved.  Comments:  2014 CST 3:38 PM CST - Max Davina  Fell back on ice with head wound.  No sequelae.   Family History:    Cancer  Father ()  Comments:  2012 10:08 AM - Davina Santana  Prostate cancer.    2010 1:33 PM - Angelique Singleton  Stomach, Prostate cancer  Asthma  Father ()  Breast cancer  Mother ()  Sister (Guadalupe)  Sister (Nereida)  Hypertension  Mother ()  High blood pressure  Sister (Guadalupe)  Sister (Nereida)  Congestive heart failure  Mother ()  Comments:  2012 10:18 AM - Davina Santana  Cunningham's lung.  Seasonal allergy  Daughter  Son     Procedure history:    Colonoscopy (376779882) in the month of 10/2014 at 73 Years.  Comments:  2016 12:58 PM - Chelo Buchanan CMA  few diverticula, otherwise normal  Upper GI endoscopy (6399619302) in the month of 10/2014 at 73 Years.  Bone density scan (326795583) in the month of 2009 at 68 Years.  Cataract extraction (12051453) in  at 66 Years.  Comments:  2010 1:30 PM - Angelique Singleton. R. 2007  Colonoscopy (398512364) on 10/6/2005 at 64 Years.  Comments:  2012 10:03 AM - Chelo Buchanan CMA  Normal - no polyps/no masses  Excision left cheek. on 2005 at 64 Years.  excision of thyroid nodule in the month of 2001 at 60 Years.  Vaginal hysterectomy (478536036) in the month of 1998 at 58 Years.  Cholecystectomy  (69358782) in 1982 at 42 Years.  Childbirth (9495253632).  Comments:  2010 1:31 PM - Angelique Singleton   3, Para 3  Blepharoplasty of upper eyelid (79709367).  Comments:  2012 1:19 PM - Gina Padilla  Ptosis  Hysterectomy (319080547).  Comments:  2014 3:37 PM - Max Davina  With one ovary remaining.  Release of trigger thumb (850004638).  Comments:  2017 1:04 PM - Chelo Buchanan CMA  right   Social History:        Alcohol Assessment            Couple times a year.      Tobacco Assessment            Never      Substance Abuse Assessment            Never      Employment and Education Assessment            Work/School description: teacher.            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      Home and Environment Assessment            Marital status: .  Spouse/Partner name: Himanshu.      Nutrition and Health Assessment            Type of diet: Regular.      Exercise and Physical Activity Assessment            Exercise frequency: 8 times per month.      Other Assessment            Marital status,         Physical Examination   Exam at Provider Discretion   Vital Signs   2017 1:26 PM CDT Temperature Tympanic 97.6 DegF  LOW    Peripheral Pulse Rate 64 bpm    Systolic Blood Pressure 156 mmHg  HI    Diastolic Blood Pressure 72 mmHg    Mean Arterial Pressure 100 mmHg    BP Systolic Repeat 144 mmHg    BP Diastolic Repeat 72 mmHg      Eye:  Extraocular movements are intact.    HENT:  Normocephalic, Oral mucosa is moist.    Neck:  Supple.    Respiratory:  Lungs are clear to auscultation, Respirations are non-labored.    Cardiovascular:  Normal rate, Regular rhythm, No edema.    Gastrointestinal:  Soft, Non-tender, Non-distended, Normal bowel sounds.    Lymphatics:  No lymphadenopathy neck, axilla, groin.    Musculoskeletal:  Normal range of motion.    Neurologic:  Alert, Oriented, Normal motor function, No focal deficits, No signs of cognitive  impairment..    Psychiatric:  Appropriate mood & affect.       Review / Management   Results review:  INCLUDE PHQ 9.       Impression and Plan   Diagnosis     Hyperlipidemia (ZBG24-EL E78.5).     Esophageal Reflux Disease (DES23-IZ K21.9).     Iron deficiency anemia (COM64-OT D50.0).     Osteoporosis (KRY01-NW M81.0).     Medicare Initial / Annual Wellness Visit.     Course:  Progressing as expected.    Plan:  Please list plan for positive findings, treatment options, risk vs. benefits..    Patient Instructions:       Counseled: Patient, Discussed preventative services including  Screening for AAA (Family history or male 65-70 who has smoked more than 100 cigarettes in a lifetime.), Lipid screening, Diabetes screening, Nutrition, Bone mass, Cancer screening (cervical, breast, colon), Immunizations (flu, Pneumovax, Hep. B, Zostavax), Glaucoma screening and ECG if needed.  Appropriate weight and diet discussed.  Discussed Advance Life Directives.    Preventative services checklist reviewed, updated and copy given to patient.  Please see scanned document.       .    .) GERD, esophagitis   - grade C; EGD in '14 ([facility])   - pathology showing no evidence of Barretts esophagus   - maintained on omeprazole 20mg daily   - recommending H2 blocker for any breakthrough symptoms    .) h/o severe iron deficiency anemia   - Hgb 6.2 ('14) - related to esophagitis, normal colonocopy in '14   - Hgb since normalized   - monitor CBC annually    .) hypertension   - blood pressure trending borderline high based on in-clinic blood pressures for some time   - monitoring for now    .) health maintenance   - osteoporosis based on '16 DEXA    - on calcium/vitamin D daily; discussed merits of bisphosphonate therapy; non-comittal currently   - mixed hyperlipidemia; high HDL and LDL    - recommending coronary calcium scoring   - trigger finger on right 4th finger - referral to Dr. Rubin    RTC annually

## 2022-02-16 NOTE — CARE COORDINATION
Pt appears on [facility] chronic disease panel as out of parameters for HTN.  Pt has RTC 9/2018, monitors BP at home per provider.BP at home on 6/14/2018 was 138/78

## 2022-02-16 NOTE — PROGRESS NOTES
"   Patient:   SURJIT BOURNE            MRN: [id number]            FIN: [id number]               Age:   81 years     Sex:  Female     :  1940   Associated Diagnoses:   Weakness; Hypertension   Author:   Himanshu Wynne PA-C      Visit Information   Accompanied by:  Spouse.       Chief Complaint   2021 4:20 PM CST   Pt here for dizziness and \"feeling off balance\" that started today.      History of Present Illness   Chief complaint and symptoms noted above and confirmed with patient   weakness and feeling off balance started today, she feels like she may pass out  but otherwise is feeling fine  has been taking her medications regularly    in the past she has not tolerated amlodipine (heart pounding, bradycardia)         Review of Systems   Constitutional:  Weakness, No fever.    Respiratory:  Negative.    Cardiovascular:  Negative.    Neurologic:  Abnormal balance.       Health Status   Allergies:    Allergic Reactions (Selected)  Severity Not Documented  Acetaminophen (Nausea and vomiting)  Aciphex (Upset stomach)  Codeine (Vomit)  HYDROcodone (Nausea and vomiting)  TraMADol (Vomit)   Medications:  (Selected)   Prescriptions  Prescribed  atorvastatin 20 mg oral tablet: = 1 tab(s), Oral, daily, # 90 tab(s), 2 Refill(s), Type: Maintenance, Pharmacy: [facility], Pt due for annual med check for further refills., 1 tab(s) Oral daily, 64, in, 09/10/21 11:20:00 CDT, Height Measured, 159, lb, 09/10/21 11:20:00 CDT,...  calcium carbonate 500 mg (200 mg elemental calcium) oral tablet, chewable: = 1 tab(s) ( 500 mg ), Chewed, bid, # 120 tab(s), 0 Refill(s), Type: Maintenance, OTC (Rx)  losartan 50 mg oral tablet: = 1 tab(s) ( 50 mg ), Oral, daily, # 90 tab(s), 2 Refill(s), Type: Maintenance, Pharmacy: [facility], 1 tab(s) Oral daily, 64, in, 09/10/21 11:20:00 CDT, Height Measured, 159, lb, 09/10/21 11:20:00 CDT, Weight Measured  omeprazole 20 mg oral delayed release capsule: = 1 cap(s) ( 20 mg ), " Oral, daily, # 90 cap(s), 2 Refill(s), Type: Maintenance, Pharmacy: [facility] [id number] [facility], keep on file and pt will notify when needed, 1 cap(s) Oral daily, 64, in, 09/10/21 11:20:00 CDT, Height Measured, 159, lb, 09/10/21 11:20:00...  Documented Medications  Documented  ibuprofen: po, prn, PRN: as needed for pain, 0 Refill(s), Type: Maintenance,    Medications          *denotes recorded medication          atorvastatin 20 mg oral tablet: 1 tab(s), Oral, daily, 90 tab(s), 2 Refill(s).          calcium carbonate 500 mg (200 mg elemental calcium) oral tablet, chewable: 500 mg, 1 tab(s), Chewed, bid, 120 tab(s), 0 Refill(s).          *ibuprofen: po, prn, PRN: as needed for pain, 0 Refill(s).          losartan 50 mg oral tablet: 50 mg, 1 tab(s), Oral, daily, 90 tab(s), 2 Refill(s).          omeprazole 20 mg oral delayed release capsule: 20 mg, 1 cap(s), Oral, daily, 90 cap(s), 2 Refill(s).       Problem list:    All Problems (Selected)  Osteoporosis / SNOMED CT 054337251 / Confirmed  Iron deficiency anemia / SNOMED CT 637189684 / Confirmed  OA (osteoarthritis) / SNOMED CT 4189324620 / Confirmed  Blood pressure elevated / SNOMED CT 395625116 / Confirmed  History of chickenpox / SNOMED CT 145966330 / Confirmed  Obesity / SNOMED CT 8505011819 / Probable  Esophageal reflux disease / SNOMED CT 215053891 / Confirmed  Pure hypercholesterolemia / SNOMED CT 391495286 / Confirmed      Histories   Past Medical History:    Active  OA (osteoarthritis) (8341258331): Onset in 2006 at 66 years.  Comments:  12/19/2012 CST 1:20 PM CST - Gina Padilla  Right knee  Esophageal reflux disease (692561401)  Obesity (7436498679)  Blood pressure elevated (497442500)  Iron deficiency anemia (964843295)  History of chickenpox (896923557)  Resolved  Seizures (635970333): Onset in 1960 at 20 years.  Resolved.  Comments:  12/20/2012 CST 10:13 AM CST - Davina Santana  Of unknown origin.  Acute lower UTI (urinary tract infection) (1938734647):   Resolved.  Bilateral carpal tunnel syndrome (15976488):  Resolved.  Head trauma (952542059):  Resolved.  Comments:  2014 CST 3:38 PM DENICE Santana Davina  Fell back on ice with head wound.  No sequelae.  Pregnancy (370455363):  Resolved in  at 26 years.  Pregnancy (589731975):  Resolved in  at 28 years.  Pregnancy (157878816):  Resolved in  at 30 years.   Family History:    Cancer  Father ()  Comments:  2012 10:08 AM Davina Mccann  Prostate cancer.    2010 1:33 PM Angelique Montemayor  Stomach, Prostate cancer  Asthma  Father ()  Breast cancer  Mother ()  Sister (Guadalupe)  Sister (Nereida)  Hypertension  Mother ()  High blood pressure  Sister (Guadalupe)  Sister (Nereida)  Arthritis  Father ()  Mother ()  Congestive heart failure  Mother ()  Comments:  2012 10:18 AM Davina Mccann  Cunningham's lung.  Seasonal allergy  Daughter  Son (Faraz)     Procedure history:    Release of trigger finger (792741705) in the month of 2020 at 79 Years.  Comments:  12/3/2020 10:43 AM CST - [place] Chelo SCHILLING  left finger  Colonoscopy (363443826) in the month of 10/2014 at 73 Years.  Comments:  2016 12:58 PM CDT - [place] Chelo SCHILLING  few diverticula, otherwise normal  Upper GI endoscopy (6307668378) in the month of 10/2014 at 73 Years.  Bone density scan (556986933) in the month of 2009 at 68 Years.  Cataract extraction (11803067) in  at 66 Years.  Comments:  2010 1:30 PM Angelique Montemayor 2006. R. 2007  Colonoscopy (895617902) on 10/6/2005 at 64 Years.  Comments:  2012 10:03 AM CST - Chelo Buchanan CMA  Normal - no polyps/no masses  Excision left cheek. on 2005 at 64 Years.  excision of thyroid nodule in the month of 2001 at 60 Years.  Vaginal hysterectomy (388949223) in the month of 1998 at 58 Years.  Cholecystectomy (14669993) in 1982 at 42 Years.  Childbirth (9180888773).  Comments:  2010 1:31 PM CST -  "Angelique Singleton   3, Para 3  Blepharoplasty of upper eyelid (93421722).  Comments:  2012 1:19 PM CST - Gina Padilla  Ptosis  Hysterectomy (111279413).  Comments:  2014 3:37 PM CST - Davina Santana  With one ovary remaining.  Release of trigger thumb (773240817).  Comments:  2017 1:04 PM CDT - Chelo Buchanan CMA  right   Social History:        Electronic Cigarette/Vaping Assessment            Electronic Cigarette Use: Never.      Alcohol Assessment            Past      Tobacco Assessment            Never (less than 100 in lifetime)      Substance Abuse Assessment            Never      Employment and Education Assessment            Work/School description: teacher.            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      Home and Environment Assessment            Marital status: .  Spouse/Partner name: Himanshu.      Nutrition and Health Assessment            Type of diet: Regular.      Exercise and Physical Activity Assessment                     Comments:                      2017 - Chelo Gonzales                     Walking, biking, \"some leg and back weights\".  Five times per week.      Other Assessment            Marital status,         Physical Examination   Vital Signs   2021 4:33 PM CST Systolic Blood Pressure Supine 216 mmHg    Diastolic Blood Pressure Supine 90 mmHg    Pulse Supine 66 bpm    Systolic Blood Pressure Sitting 238 mmHg    Diastolic Blood Pressure Sitting 113 mmHg    Pulse Sitting 64 bpm  HI    Systolic Blood Pressure Standing 226 mmHg    Diastolic Blood Pressure Standing 119 mmHg    Pulse Standing 73 bpm   2021 4:20 PM CST Peripheral Pulse Rate 72 bpm    Pulse Site Radial artery    Respiratory Rate 20 br/min    Systolic Blood Pressure 198 mmHg  HI    Diastolic Blood Pressure 95 mmHg  HI    Mean Arterial Pressure 129 mmHg    Oxygen Saturation 97 %      Measurements from flowsheet : Measurements   2021 4:20 PM CST " Height Measured - Standard 64 in    Height/Length Estimated 64.5 in    Weight Measured - Standard 160 lb    BSA 1.81 m2    Body Mass Index 27.46 kg/m2  HI      General:  No acute distress.    Respiratory:  Lungs are clear to auscultation.    Cardiovascular:  Normal rate, Regular rhythm, No murmur, No edema.    Cognition and Speech:  Speech clear and coherent.    Psychiatric:  Appropriate mood & affect.       Review / Management   Results review:  CBC is normal.    ECG interpretation:  sinus rhythm with first degree AV block, left axis deviation. left ventricular hypertrophy, no change from Jan 2021  read by Dr Arboleda.       Impression and Plan   Diagnosis     Weakness (JWM74-SG R53.1).     Hypertension (YXV01-YB I10).     Summary:  will add metoprolol in the morning (she is intolerant to amlodipine), she takes her valsartan at night, follow up with Dr Aguiar next week.    Orders     Orders   Requests (Lab):  CBC w/ Auto Diff (Request) (Order): Priority: STAT, Weakness.     Orders   Pharmacy:  metoprolol succinate 25 mg oral tablet, extended release (Prescribe): = 1 tab(s) ( 25 mg ), PO, Daily, # 30 tab(s), 1 Refill(s), Type: Maintenance, Pharmacy: [facility] [id number] [facility], 1 tab(s) Oral daily, 64, in, 12/17/2021 4:33 PM CST, Height Measured, 160, lb, 12/17/2021 4:20 PM CST, Weight Measured.     Orders   Charges (Evaluation and Management):  42556 office o/p est mod 30-39 min (Charge) (Order): Quantity: 1, Weakness  Hypertension.

## 2022-02-16 NOTE — PROGRESS NOTES
Patient:   SURJIT BOURNE            MRN: [id number]            FIN: [id number]               Age:   80 years     Sex:  Female     :  1940   Associated Diagnoses:   None   Author:   Mg Aguiar MD      Visit Information      Date of Service: 2020 01:50 pm  Performing Location: [facility]  Encounter#: [id number]      Primary Care Provider (PCP):  Mg Aguiar MD    NPI# [id number]      Referring Provider:  Mg Aguiar MD    NPI# [id number]   Visit type:  Annual exam.       Chief Complaint   12/3/2020 1:59 PM CST    AWV     Medicare Initial / Annual Preventative Visit     HPI:          Well Adult History     ADL's and Safety were reviewed.  None found.  Please see copy of scanned form.    I have reviewed with the patient the completed health risk assessment and health history form and we have agreed on the following plans for identified risk factors. None found.  Please see copy of scanned form.    No hearing impairment, No problems with Activities of Daily Living, Not at risk for falls and Home is safe.  Patient completed  Health Risk Assessment form and Patient Health History form were reviewed with the patient and any risks identified and plans to deal with these risks are identified in the Assessment and Plan below.  Other Health Care Providers are as currently listed in the Medical Record as a Quick Note for eye care, other medical specialists, health agencies and pharmacy and medical suppliers          Well Adult History             The patient presents for well adult exam.  I've reviewed and agree with above chief complaint and comments   .              Review of Systems   Eye:  See Preventative Services Checklist for Vision/Glaucoma Test dates..    Ear/Nose/Mouth/Throat:  Hearing is addressed and no impairment noted..    Respiratory:  No shortness of breath.    Cardiovascular:  No chest pain.    Gastrointestinal:  No nausea, No vomiting, No diarrhea.    Genitourinary:  No dysuria.     Musculoskeletal:  No neck pain, No joint pain.    Neurologic:  Alert and oriented X4.    Psychiatric:  GDS Noted  the GDS and the CAGE assessments were reviewed and discussed with the patient.    See completed Health History for Review of Systems.      Health Status   Allergies:    Allergic Reactions (Selected)  Severity Not Documented  Acetaminophen (Nausea and vomiting)  Aciphex (Upset stomach)  Codeine (Vomit)  HYDROcodone (Nausea and vomiting)  TraMADol (Vomit)   Medications:  (Selected)   Prescriptions  Prescribed  atorvastatin 20 mg oral tablet: = 1 tab(s), Oral, daily, # 30 tab(s), 0 Refill(s), Type: Maintenance, Pharmacy: [facility] #[id number], Pt due for annual med check for further refills., 1 tab(s) Oral daily, 160.4, lb, 11/19/19 10:18:00 CST, Weight Measured  losartan 50 mg oral tablet: = 1 tab(s) ( 50 mg ), Oral, daily, # 90 tab(s), 3 Refill(s), Type: Maintenance, Pharmacy: [facility] #[id number], 1 tab(s) Oral daily, 64.5, in, 12/03/20 14:07:00 CST, Height Measured, 161, lb, 12/03/20 14:07:00 CST, Weight Measured  omeprazole 20 mg oral delayed release capsule: = 1 cap(s) ( 20 mg ), Oral, daily, # 90 cap(s), 3 Refill(s), Type: Maintenance, Pharmacy: [facility] [id number] [facility], keep on file and pt will notify when needed, 1 cap(s) Oral daily  Documented Medications  Documented  OsCal 500 oral tablet, chewable: 3 tab(s) ( 3,750 mg ), daily, Instructions: contains vitamin D3 600IU, 0 Refill(s), Type: Maintenance  ibuprofen: po, prn, PRN: as needed for pain, 0 Refill(s), Type: Maintenance,    Medications          *denotes recorded medication          atorvastatin 20 mg oral tablet: 1 tab(s), Oral, daily, 30 tab(s), 0 Refill(s).          *OsCal 500 oral tablet, chewable: 3,750 mg, 3 tab(s), daily, contains vitamin D3 600IU.          *ibuprofen: po, prn, PRN: as needed for pain, 0 Refill(s).          losartan 50 mg oral tablet: 50 mg, 1 tab(s), Oral, daily, 90 tab(s), 3 Refill(s).          omeprazole  20 mg oral delayed release capsule: 20 mg, 1 cap(s), Oral, daily, 90 cap(s), 3 Refill(s).       Problem list:    All Problems  Blood pressure elevated / SNOMED CT 412478334 / Confirmed  Esophageal reflux disease / SNOMED CT 924974315 / Confirmed  History of chickenpox / SNOMED CT 622212702 / Confirmed  Iron deficiency anemia / SNOMED CT 940373609 / Confirmed  Obesity / SNOMED CT 2716894967 / Probable  OA (osteoarthritis) / SNOMED CT 5407136196 / Confirmed  Osteoporosis / SNOMED CT 051041081 / Confirmed  Pure hypercholesterolemia / SNOMED CT 009513060 / Confirmed  Inactive: Thyroid nodule / SNOMED CT 634089711  Resolved: Acute lower UTI (urinary tract infection) / SNOMED CT 7571826584  Resolved: Bilateral carpal tunnel syndrome / SNOMED CT 53798051  Resolved: Head trauma / SNOMED CT 985376675  Resolved: Pregnancy / SNOMED CT 560634703  Resolved: Pregnancy / SNOMED CT 801720820  Resolved: Pregnancy / SNOMED CT 307408737  Resolved: Seizures / SNOMED CT 556472451  Canceled: Obesity / ICD-9-.00     Current Providers and Suppliers Noted in Demographic Area.      Histories   Past Medical History:    Active  OA (osteoarthritis) (3303961514): Onset in 2006 at 66 years.  Comments:  12/19/2012 CST 1:20 PM Gina Harmon  Right knee  Esophageal reflux disease (618766620)  Obesity (1366627745)  Blood pressure elevated (541947818)  Iron deficiency anemia (069176866)  History of chickenpox (471769788)  Resolved  Seizures (723281284): Onset in 1960 at 20 years.  Resolved.  Comments:  12/20/2012 CST 10:13 AM Davina Mccann  Of unknown origin.  Acute lower UTI (urinary tract infection) (0577815281):  Resolved.  Bilateral carpal tunnel syndrome (69260078):  Resolved.  Head trauma (714457284):  Resolved.  Comments:  11/18/2014 CST 3:38 PM Concha Mccannan  Fell back on ice with head wound.  No sequelae.  Pregnancy (177626136):  Resolved in 1967 at 26 years.  Pregnancy (204655211):  Resolved in 1969 at 28  years.  Pregnancy (448444717):  Resolved in  at 30 years.   Family History:    Cancer  Father ()  Comments:  2012 10:08 AM Davina Mccann  Prostate cancer.    2010 1:33 PM Angelique Montemayor  Stomach, Prostate cancer  Asthma  Father ()  Breast cancer  Mother ()  Sister (Guadalupe)  Sister (Nereida)  Hypertension  Mother ()  High blood pressure  Sister (Guadalupe)  Sister (Nereida)  Congestive heart failure  Mother ()  Comments:  2012 10:18 AM Davina Mccann  Cunningham's lung.  Seasonal allergy  Daughter  Son (Faraz)     Procedure history:    Release of trigger finger (284767363) in the month of 2020 at 79 Years.  Comments:  12/3/2020 10:43 AM Chelo Power CMA  left finger  Colonoscopy (322975303) in the month of 10/2014 at 73 Years.  Comments:  2016 12:58 PM CDT - Chelo Buchanan CMA  few diverticula, otherwise normal  Upper GI endoscopy (3198450616) in the month of 10/2014 at 73 Years.  Bone density scan (975554211) in the month of 2009 at 68 Years.  Cataract extraction (11381068) in  at 66 Years.  Comments:  2010 1:30 PM Angelique Montemayor 2006. R. 2007  Colonoscopy (622935557) on 10/6/2005 at 64 Years.  Comments:  2012 10:03 AM Chelo Power CMA  Normal - no polyps/no masses  Excision left cheek. on 2005 at 64 Years.  excision of thyroid nodule in the month of 2001 at 60 Years.  Vaginal hysterectomy (254686053) in the month of 1998 at 58 Years.  Cholecystectomy (53471215) in  at 42 Years.  Childbirth (4786425551).  Comments:  2010 1:31 PM Angelique Montemayor   3, Para 3  Blepharoplasty of upper eyelid (26435062).  Comments:  2012 1:19 PM Gina Harmon  Ptosis  Hysterectomy (820394848).  Comments:  2014 3:37 PM Davina Mccann  With one ovary remaining.  Release of trigger thumb (007079954).  Comments:  2017 1:04 PM CDT - Chelo Buchanan CMA   Social History:   "      Electronic Cigarette/Vaping Assessment            Electronic Cigarette Use: Never.      Alcohol Assessment            Past      Tobacco Assessment            Never (less than 100 in lifetime)      Substance Abuse Assessment            Never      Employment and Education Assessment            Work/School description: teacher.            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      Home and Environment Assessment            Marital status: .  Spouse/Partner name: Himanshu.      Nutrition and Health Assessment            Type of diet: Regular.      Exercise and Physical Activity Assessment                     Comments:                      09/14/2017 - Chelo Gonzales                     Walking, biking, \"some leg and back weights\".  Five times per week.      Other Assessment            Marital status,         Physical Examination   Exam at Provider Discretion   Vital Signs   12/3/2020 1:59 PM CST Temperature Tympanic 96.5 DegF  LOW    Peripheral Pulse Rate 64 bpm    Systolic Blood Pressure 170 mmHg  HI    Diastolic Blood Pressure 72 mmHg    Mean Arterial Pressure 105 mmHg    Oxygen Saturation 95 %      Eye:  Extraocular movements are intact.    HENT:  Normocephalic, Oral mucosa is moist.    Neck:  Supple.    Respiratory:  Lungs are clear to auscultation, Respirations are non-labored.    Cardiovascular:  Normal rate, Regular rhythm, No edema.    Gastrointestinal:  Soft, Non-tender, Non-distended, Normal bowel sounds.    Lymphatics:  No lymphadenopathy neck, axilla, groin.    Musculoskeletal:  Normal range of motion.    Neurologic:  Alert, Oriented, Normal motor function, No focal deficits, No signs of cognitive impairment..    Psychiatric:  Appropriate mood & affect.       Review / Management   Results review:  INCLUDE PHQ 9.       Impression and Plan   Diagnosis     Medicare Initial / Annual Wellness Visit.     Course:  Progressing as expected.    Plan:  Please list plan for " positive findings, treatment options, risk vs. benefits..    Patient Instructions:       Counseled: Patient, Discussed preventative services including  Screening for AAA (Family history or male 65-70 who has smoked more than 100 cigarettes in a lifetime.), Lipid screening, Diabetes screening, Nutrition, Bone mass, Cancer screening (cervical, breast, colon), Immunizations (flu, Pneumovax, Hep. B, Zostavax), Glaucoma screening and ECG if needed.  Appropriate weight and diet discussed.  Discussed Advance Life Directives.    Preventative services checklist reviewed, updated and copy given to patient.  Please see scanned document.       .

## 2022-02-16 NOTE — TELEPHONE ENCOUNTER
Entered by Gina Walton MA on November 16, 2020 10:29:17 AM CST  ---------------------  From: Gina Walton MA   To: [facility] [id number] [facility]    Sent: 11/16/2020 10:29:17 AM CST  Subject: Medication Management     ** Submitted: **  Order:atorvastatin (atorvastatin 20 mg oral tablet)  1 tab(s)  Oral  daily  Qty:  30 tab(s)        Refills:  0          Substitutions Allowed     Route To [facility] [id number] [facility]    Signed by Gina Walton MA  11/16/2020 4:28:00 PM [facility]    ** Submitted: **  Complete:atorvastatin (atorvastatin 20 mg oral tablet)   Signed by Gina Walton MA  11/16/2020 4:29:00 PM [facility]    ** Not Approved:  **  atorvastatin (ATORVASTATIN 20 MG TABLET)  TAKE 1 TABLET BY MOUTH EVERY DAY  Qty:  90 tab(s)        Days Supply:  90        Refills:  3          Substitutions Allowed     Route To [facility] [address] [facility]   Signed by Gina Walton MA            ------------------------------------------  From: [facility] [id number] [facility]  To: Mg Aguiar MD  Sent: November 14, 2020 11:37:11 AM CST  Subject: Medication Management  Due: November 7, 2020 12:21:53 AM CST     ** On Hold Pending Signature **     Dispensed Drug: atorvastatin (atorvastatin 20 mg oral tablet), TAKE 1 TABLET BY MOUTH EVERY DAY  Quantity: 90 tab(s)  Days Supply: 90  Refills: 3  Substitutions Allowed  Notes from Pharmacy:  ------------------------------------------Med Refill      Date of last office visit and reason:  11/19/19 w/ BRJOSE DAVID for CDM f/u      Date of last Med Check / Px:   _  Date of last labs pertaining to med:  _    Note:  _    RTC order in chart:  RTC now due    For Protocol refill, has patient been contacted:  message sent to pharmacy

## 2022-02-16 NOTE — PROGRESS NOTES
Chief Complaint    fall at home, fell right onto the bathtub. fell on shoulder years ago was given a shot into shoulder and help right away. oral pain meds do not agree with pt. 10/10 pain ice did not help. moving makes it worse.  History of Present Illness      patient present to clinic today for follow up from a fall last night      does not tolerate opiates, did not hit her head, has hx of GIB, has been told not to take nsaids, tried ibuprofen without relief, also tried a tylenol without relief, has vomitted after using codein and vicodin and tramadol,      discussed r/b/a of using a shot of toradol, shei is aware of risk of BIG and kidney damage, wouldl aviva to have the shot today,       Review of systems is negative except as per HPI including:  no fevers, chills, sore throat, runny nose, nausea, vomiting, constipation, diarrhea, rash or new skin lesions, chest pain, palpitations, slurred speech, new paresthesia, shortness of breath or wheeze.      Exam:      General: alert and oriented ×3 no acute distress.      HEENT: pupils are equal round and reactive to light extraocular motion is intact. Normocephalic and atraumatic.       Hearing is grossly normal and there is no otorrhea.       Nares are patent there is no rhinorrhea.       Mucous membranes are moist and pink.      Chest: has bilateral rise with no increased work of breathing.      Cardiovascular: normal perfusion and brisk capillary refill.      Musculoskeletal: no gross focal abnormalities and antalgic  gait.  ecchymosis left lateral lumbar back, no point tenderness or step-offs,      Neuro: no gross focal abnormalities and memory seems intact.      Psychiatric: speech is clear and coherent and fluent. Patient dressed appropriately for the weather. Mood is appropriate and affect is full.                     Discussed with patient to return to clinic if symptoms worsen or do not improve  Physical Exam   Vitals & Measurements    T: 97.7   F (Tympanic)   HR: 65(Peripheral)  BP: 120/78  SpO2: 94%     HT: 64.5 in   Assessment/Plan       Back pain (M54.9)         Orders:          ketorolac, 30 mg, im, once, (Completed)          90246 therapeutic prophylactic/dx injection subq/im (Charge), Quantity: 1, Back pain          46515 office outpatient visit 25 minutes (Charge), Quantity: 1, Fall at home  Back pain           ketorolac tromethamine inj, 15 mg (Charge), Quantity: 2, Back pain                Fall at home (W19.XXXA)          can try ice and tylenol 1000 mg qid scheduled and offered acupuncture but pt declined for now.         Orders:          24296 office outpatient visit 25 minutes (Charge), Quantity: 1, Fall at home  Back pain           Patient Information     Name:SURJIT BOURNE      Address:      [address] [place] [id number]     Sex:Female     YOB: 1940     [phone]     Emergency Contact:TORRI BOURNE     MRN:[id number]     FIN:[id number]     Location:[facility]     Date of Service:07/25/2018      Primary Care Physician:       Mg Aguiar MD, [phone]      Attending Physician:       Lorraine Del Rio MD, [phone]  Problem List/Past Medical History    Ongoing     Blood pressure elevated     Esophageal reflux disease     History of chickenpox     Iron deficiency anemia     OA (osteoarthritis)       Comments: Right knee     Obesity     Osteoporosis     Pure hypercholesterolemia     Thyroid nodule    Historical     Acute lower UTI (urinary tract infection)     Bilateral carpal tunnel syndrome     Head trauma       Comments: Fell back on ice with head wound. No sequelae.     Pregnancy     Pregnancy     Pregnancy     Seizures       Comments: Of unknown origin.  Procedure/Surgical History     Colonoscopy (10/2014)            Comments:      few diverticula, otherwise normal     Upper GI endoscopy (10/2014)           Bone density scan (09/2009)           Cataract extraction (2007)             Comments:      L. 2006. R. 2007     Colonoscopy (10/06/2005)            Comments:      Normal - no polyps/no masses     Excision left cheek. (2005)           excision of thyroid nodule (2001)           Vaginal hysterectomy (1998)           Cholecystectomy ()           Blepharoplasty of upper eyelid            Comments:      Ptosis     Childbirth            Comments:       3, Para 3     Hysterectomy            Comments:      With one ovary remaining.     Release of trigger thumb            Comments:      right  Medications     OsCal 500 oral tablet, chewable: 3,750 mg, 3 tab(s), daily, contains vitamin D3 600IU.     ibuprofen: po, prn, PRN: as needed for pain, 0 Refill(s).     omeprazole 20 mg oral delayed release capsule: 20 mg, 1 cap(s), PO, Daily, 90 cap(s), 3 Refill(s).     atorvastatin 20 mg oral tablet: See Instructions, TAKE 1 TABLET EVERY DAY, 90 tab(s), 1 Refill(s).          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 Spouse/Partner name: Himanshu., 2012     Nutrition and Health      Type of diet: Regular., 2013     Other      Marital status, , 2010     Substance Abuse      Never, 2011     Tobacco      Never, 2011  Family History    Asthma: Father.    Breast cancer: Mother, Sister and Sister.    Cancer: Father.    Congestive heart failure: Mother.    High blood pressure: Sister and Sister.    Hypertension: Mother.    Seasonal allergy: Daughter and Son.    Daughter: History is  negative  Immunizations      Vaccine Date Status Comments      influenza virus vaccine, inactivated 09/13/2017 Given      influenza virus vaccine, inactivated 09/30/2016 Given      influenza virus vaccine, inactivated 10/06/2015 Given      pneumococcal (PCV13) 06/03/2015 Given      influenza virus vaccine, inactivated 10/01/2014 Recorded      influenza virus vaccine, inactivated 09/10/2014 Given      influenza virus vaccine, inactivated 10/10/2013 Given      tetanus/diphth/pertuss (Tdap) adult/adol 12/21/2012 Recorded      influenza virus vaccine, inactivated 10/03/2012 Given      ZOS, shingles 01/01/2012 Recorded      influenza virus vaccine, inactivated 09/21/2011 Given      ZOS, shingles 07/15/2011 Recorded      influenza 09/24/2010 Given      influenza, H1N1, inactivated 12/11/2009 Recorded      influenza 09/22/2009 Recorded      influenza 10/22/2008 Recorded      influenza 10/29/2007 Recorded      Hep B 08/17/2006 Recorded      Hep B 04/10/2006 Recorded      Hep B 02/17/2006 Recorded      typhoid, inactivated 02/17/2006 Recorded      pneumococcal (PPSV23) 09/28/2005 Recorded      Td 09/12/2005 Recorded      Hep A 06/01/2001 Recorded      Hep A 01/01/2001 Recorded      Td 01/01/1994 Recorded

## 2022-02-16 NOTE — TELEPHONE ENCOUNTER
---------------------  From: Diane Acevedo CMA (Phone Messages Pool ([id number]_[facility]))   Sent: 11/20/2020 2:53:19 PM CST  Subject: General Message     PCP:   ASHLEY     Time of Call:  1428      Person Calling:  Lorna  Phone number:  [phone]    Returned call at: [phone]    Note:   Patient called requesting refill of Atorvastatin. This was filled 11/16 for 30 day and [facility]. [facility] rx sent. Pt has lab appt and AWV scheduled in the next 2 weeks for further refills.

## 2022-02-16 NOTE — NURSING NOTE
Comprehensive Intake Entered On:  9/10/2021 11:23 AM CDT    Performed On:  9/10/2021 11:20 AM CDT by Dee Real CMA               Summary   Chief Complaint :   HTN f/u and labs   Advance Directive :   Yes   Weight Measured :   159 lb(Converted to: 159 lb 0 oz, 72.121 kg)    Height Measured :   64 in(Converted to: 5 ft 4 in, 162.56 cm)    Body Mass Index :   27.29 kg/m2 (HI)    Body Surface Area :   1.8 m2   Height/Length Estimated :   64.5 in(Converted to: 5 ft 4 in, 163.83 cm)    Systolic Blood Pressure :   136 mmHg (HI)    Diastolic Blood Pressure :   88 mmHg (HI)    Mean Arterial Pressure :   104 mmHg   Peripheral Pulse Rate :   65 bpm   BP Site :   Right arm   BP Method :   Manual   Temperature Tympanic :   97.1 DegF(Converted to: 36.2 DegC)  (LOW)    Oxygen Saturation :   96 %   Dee Real CMA - 9/10/2021 11:20 AM CDT   Health Status   Allergies Verified? :   Yes   Medication History Verified? :   Yes   Immunizations Current :   Yes   Pre-Visit Planning Status :   Completed   Tobacco Use? :   Never smoker   Dee Real CMA - 9/10/2021 11:20 AM CDT   Consents   Consent for Immunization Exchange :   Consent Granted   Consent for Immunizations to Providers :   Consent Granted   Dee Real CMA - 9/10/2021 11:20 AM CDT   Meds / Allergies   (As Of: 9/10/2021 11:23:27 AM CDT)   Allergies (Active)   acetaminophen  Estimated Onset Date:   Unspecified ; Reactions:   nausea and vomiting ; Created By:   Davina Santana; Reaction Status:   Active ; Category:   Drug ; Substance:   acetaminophen ; Type:   Allergy ; Updated By:   Davina Santana; Reviewed Date:   9/10/2021 11:20 AM CDT      Aciphex  Estimated Onset Date:   Unspecified ; Reactions:   Upset stomach ; Created By:   Gina Padilla; Reaction Status:   Active ; Category:   Drug ; Substance:   Aciphex ; Type:   Allergy ; Updated By:   Gina Padilla; Reviewed Date:   9/10/2021 11:20 AM CDT      codeine  Estimated Onset Date:   Unspecified ; Reactions:    Vomit ; Created By:   Angelique Singleton; Reaction Status:   Active ; Category:   Drug ; Substance:   codeine ; Type:   Allergy ; Updated By:   Angelique Singleton; Reviewed Date:   9/10/2021 11:20 AM CDT      HYDROcodone  Estimated Onset Date:   Unspecified ; Reactions:   nausea and vomiting ; Created By:   Davina Santana; Reaction Status:   Active ; Category:   Drug ; Substance:   HYDROcodone ; Type:   Allergy ; Updated By:   Davina Santana; Reviewed Date:   9/10/2021 11:20 AM CDT      traMADol  Estimated Onset Date:   Unspecified ; Reactions:   Vomit ; Created By:   Abhishek Song CMA; Reaction Status:   Active ; Category:   Drug ; Substance:   traMADol ; Type:   Allergy ; Updated By:   Abhishek Song CMA; Reviewed Date:   9/10/2021 11:20 AM CDT        Medication List   (As Of: 9/10/2021 11:23:27 AM CDT)   Prescription/Discharge Order    atorvastatin  :   atorvastatin ; Status:   Prescribed ; Ordered As Mnemonic:   atorvastatin 20 mg oral tablet ; Simple Display Line:   1 tab(s), Oral, daily, 90 tab(s), 2 Refill(s) ; Ordering Provider:   Mg Aguiar MD; Catalog Code:   atorvastatin ; Order Dt/Tm:   1/25/2021 2:56:44 PM CST          calcium carbonate  :   calcium carbonate ; Status:   Prescribed ; Ordered As Mnemonic:   calcium carbonate 500 mg (200 mg elemental calcium) oral tablet, chewable ; Simple Display Line:   500 mg, 1 tab(s), Chewed, bid, 120 tab(s), 0 Refill(s) ; Ordering Provider:   Mg Aguiar MD; Catalog Code:   calcium carbonate ; Order Dt/Tm:   1/14/2021 2:16:45 PM CST          losartan  :   losartan ; Status:   Prescribed ; Ordered As Mnemonic:   losartan 50 mg oral tablet ; Simple Display Line:   50 mg, 1 tab(s), Oral, daily, 90 tab(s), 2 Refill(s) ; Ordering Provider:   Mg Aguiar MD; Catalog Code:   losartan ; Order Dt/Tm:   1/25/2021 3:00:08 PM CST          omeprazole  :   omeprazole ; Status:   Prescribed ; Ordered As Mnemonic:   omeprazole 20 mg oral delayed release capsule ; Simple Display Line:    20 mg, 1 cap(s), Oral, daily, 90 cap(s), 2 Refill(s) ; Ordering Provider:   Mg Aguiar MD; Catalog Code:   omeprazole ; Order Dt/Tm:   1/25/2021 3:00:43 PM CST            Home Meds    ibuprofen  :   ibuprofen ; Status:   Documented ; Ordered As Mnemonic:   ibuprofen ; Simple Display Line:   po, prn, PRN: as needed for pain, 0 Refill(s) ; Catalog Code:   ibuprofen ; Order Dt/Tm:   2/14/2017 1:21:00 PM CST            ID Risk Screen   Recent Travel History :   No recent travel   Family Member Travel History :   No recent travel   Other Exposure to Infectious Disease :   Unknown   COVID-19 Testing Status :   No positive COVID-19 test   Dee Real CMA - 9/10/2021 11:20 AM CDT

## 2022-02-16 NOTE — LETTER
(Inserted Image. Unable to display)   [address] [place] [address]  September 07, 2021      [place] [address]        Dear SURJIT,     Thank you for selecting [facility] for your healthcare needs. Below you will find the results of your recent test(s) done at our clinic.       Labs for your review.  We can discuss in more detail at future clinic visit.       Result Name Current Result Previous Result Reference Range   Sodium Level (mmol/L)  137 9/1/2021  136 11/25/2020 135 - 146   Potassium Level (mmol/L)  4.9 9/1/2021  4.3 11/25/2020 3.5 - 5.3   Chloride Level (mmol/L)  103 9/1/2021  102 11/25/2020 98 - 110   CO2 Level (mmol/L)  27 9/1/2021  27 11/25/2020 20 - 32   Glucose Level (mg/dL)  94 9/1/2021  97 11/25/2020 65 - 99   BUN (mg/dL)  18 9/1/2021  17 11/25/2020 7 - 25   Creatinine Level (mg/dL) ((H)) 0.89 9/1/2021 ((H)) 0.94 11/25/2020 0.60 - 0.88   BUN/Creat Ratio  20 9/1/2021  18 11/25/2020 6 - 22   eGFR (mL/min/1.73m2)  61 9/1/2021 ((L)) 57 11/25/2020 > OR = 60 -    eGFR  (mL/min/1.73m2)  70 9/1/2021  66 11/25/2020 > OR = 60 -    Calcium Level (mg/dL)  9.8 9/1/2021  9.6 11/25/2020 8.6 - 10.4   Cholesterol (mg/dL)  177 9/1/2021  177 11/25/2020  - <200   Non-HDL Cholesterol  106 9/1/2021  110 11/25/2020  - <130   HDL (mg/dL)  71 9/1/2021  67 11/25/2020 > OR = 50 -    Cholesterol/HDL Ratio  2.5 9/1/2021  2.6 11/25/2020  - <5.0   LDL  91 9/1/2021  93 11/25/2020    Triglyceride (mg/dL)  61 9/1/2021  77 11/25/2020  - <150   WBC  7.4 9/1/2021  7.3 11/25/2020 3.8 - 10.8   RBC  4.07 9/1/2021  4.14 11/25/2020 3.80 - 5.10   Hgb (gm/dL)  12.7 9/1/2021  13.4 11/25/2020 11.7 - 15.5   Hct (%)  38.4 9/1/2021  38.3 11/25/2020 35.0 - 45.0   MCV (fL)  94.3 9/1/2021  92.5 11/25/2020 80.0 - 100.0   MCH (pg)  31.2 9/1/2021  32.4 11/25/2020 27.0 - 33.0   MCHC (gm/dL)  33.1 9/1/2021  35.0 11/25/2020 32.0 - 36.0    RDW (%)  12.3 9/1/2021  12.1 11/25/2020 11.0 - 15.0   Platelet  315 9/1/2021  287 11/25/2020 140 - 400   MPV (fL)  10.2 9/1/2021  10.7 11/25/2020 7.5 - 12.5       Please contact me or my assistant at [phone] if you have any questions or concerns.     Sincerely,        Mg Aguiar MD    What do your labs mean?  Below is a glossary of commonly ordered labs:  LDL - Bad Cholesterol  HDL - Good Cholesterol  AST/ALT - Liver Function  Cr/Creatinine - Kidney Function  Microalbumin - Kidney Function  BUN - Kidney Function  PSA - Prostate   TSH - Thyroid Hormone  HgbA1c - Diabetes Test  Hgb (Hemoglobin) - Red Blood Cells

## 2022-02-16 NOTE — NURSING NOTE
"Comprehensive Intake Entered On:  2021 1:08 PM CST    Performed On:  2021 1:03 PM CST by Dewayne SCHILLING Chelo               Summary   Chief Complaint :   1.  f/u HTN/\"spells\" - describes having difficulty walking straight at times, difficutly ocntrollijng her body, visutal disturbance    Advance Directive :   Yes   Weight Measured :   161.9 lb(Converted to: 161 lb 14 oz, 73.437 kg)    Height Measured :   64 in(Converted to: 5 ft 4 in, 162.56 cm)    Body Mass Index :   27.79 kg/m2 (HI)    Body Surface Area :   1.82 m2   Height/Length Estimated :   64.5 in(Converted to: 5 ft 4 in, 163.83 cm)    Systolic Blood Pressure :   214 mmHg (HI)    Diastolic Blood Pressure :   84 mmHg (HI)    Mean Arterial Pressure :   127 mmHg   Peripheral Pulse Rate :   60 bpm   Temperature Tympanic :   97.4 DegF(Converted to: 36.3 DegC)  (LOW)    Oxygen Saturation :   95 %   Chelo Buchanan CMA 2021 1:03 PM CST   Health Status   Allergies Verified? :   Yes   Medication History Verified? :   Yes   Immunizations Current :   Yes   Medical History Verified? :   Yes   Pre-Visit Planning Status :   Completed   Tobacco Use? :   Never smoker   Luann Buchanan CMA2021 1:03 PM CST   Demographics   Last Name :   Almaz   Address :   [address] [place] [address]   First Name :   Lorna Acosta Initial :   JOSE DAVID   Responsible Party Date of Birth () :   1940 CST   City :   [place]   State :   [place]   Zip Code :   [address]   Contact Relationship to Patient (other) :   Patient   Dewayne Chelo SCHILLING 2021 1:03 PM CST   Providers Grid   Provider Name :    MD Faraz Bliss DDS Martin Bruhl, MD        Provider Specialty :    Ophthalmologist      Ophthalmologist        Comments :    Phone:  [phone]   Phone:  [phone]   Phone:  [phone]          Chelo Buchanan CMA 2021 1:03 PM CST  Chelo Buchanan CMA 2021 1:03 PM CST  Chelo Buchanan CMA 2021 1:03 PM CST       [facility]   Name :    " "[facility]              Type of Service :    Pharmacy                Chelo Buchanan CMA - 12/22/2021 1:03 PM CST         Social History   Social History   (As Of: 12/22/2021 1:08:58 PM CST)   Alcohol:        Past   (Last Updated: 9/15/2017 9:34:39 AM CDT by Ines Iyer)          Tobacco:        Never (less than 100 in lifetime)   (Last Updated: 12/17/2021 4:21:39 PM CST by Timbo Steinberg CMA)          Electronic Cigarette/Vaping:        Electronic Cigarette Use: Never.   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   (Last Updated: 12/19/2012 1:22:59 PM CST by Gina Padilla)   Work/School description: teacher.   (Last Updated: 12/10/2010 10:19:59 AM CST by Alejandro Wheeler MD)          Home/Environment:        Marital status: .  Spouse/Partner name: Himanshu.   (Last Updated: 12/21/2012 10:58:31 AM CST by Chelo Buchanan CMA)          Nutrition/Health:        Type of diet: Regular.   (Last Updated: 12/31/2013 3:33:43 PM CST by Ines Ramos MA)          Exercise:         Comments:  9/14/2017 8:52 AM - Chelo Gonzales: Walking, biking, \"some leg and back weights\".  Five times per week.   (Last Updated: 9/14/2017 8:52:23 AM CDT by Chelo Gonzales)          Other:        Marital status,    (Last Updated: 12/8/2010 1:34:08 PM CST by Angelique Singleton)  "

## 2022-02-16 NOTE — LETTER
(Inserted Image. Unable to display)   November 08, 2019      SURJIT BOURNE  [address]  [place], [place] [id number]                  Result Name Current Result Reference Range   Lab Report   11/7/2019    Cholesterol (mg/dL)  154 11/7/2019  - <200   HDL (mg/dL)  67 11/7/2019 >50 -    Triglyceride (mg/dL)  45 11/7/2019  - <150   LDL  74 11/7/2019    Cholesterol/HDL Ratio  2.3 11/7/2019  - <5.0   Non-HDL Cholesterol  87 11/7/2019  - <130   Glucose Level (mg/dL)  90 11/7/2019 65 - 99   BUN (mg/dL)  14 11/7/2019 7 - 25   Creatinine Level (mg/dL) ((H)) 0.99 11/7/2019 0.60 - 0.88   eGFR (mL/min/1.73m2) ((L)) 54 11/7/2019 > OR = 60 -    eGFR  (mL/min/1.73m2)  62 11/7/2019 > OR = 60 -    BUN/Creat Ratio  14 11/7/2019 6 - 22   Sodium Level (mmol/L)  137 11/7/2019 135 - 146   Potassium Level (mmol/L)  4.2 11/7/2019 3.5 - 5.3   Chloride Level (mmol/L)  103 11/7/2019 98 - 110   CO2 Level (mmol/L)  27 11/7/2019 20 - 32   Calcium Level (mg/dL)  9.4 11/7/2019 8.6 - 10.4   WBC  6.3 11/7/2019 3.8 - 10.8   RBC  3.84 11/7/2019 3.80 - 5.10   Hgb (gm/dL)  11.9 11/7/2019 11.7 - 15.5   Hct (%)  35.6 11/7/2019 35.0 - 45.0   MCV (fL)  92.7 11/7/2019 80.0 - 100.0   MCH (pg)  31.0 11/7/2019 27.0 - 33.0   MCHC (gm/dL)  33.4 11/7/2019 32.0 - 36.0   RDW (%)  12.0 11/7/2019 11.0 - 15.0   Platelet  307 11/7/2019 140 - 400   MPV (fL)  10.0 11/7/2019 7.5 - 12.5         Sincerely,        Mg Aguiar MD

## 2022-02-16 NOTE — NURSING NOTE
"Comprehensive Intake Entered On:  12/17/2021 4:32 PM CST    Performed On:  12/17/2021 4:20 PM CST by Timbo Steinberg CMA               Summary   Chief Complaint :   Pt here for dizziness and \"feeling off balance\" that started today.   Advance Directive :   Yes   Weight Measured :   160 lb(Converted to: 160 lb 0 oz, 72.575 kg)    Height Measured :   64 in(Converted to: 5 ft 4 in, 162.56 cm)    Body Mass Index :   27.46 kg/m2 (HI)    Body Surface Area :   1.81 m2   Height/Length Estimated :   64.5 in(Converted to: 5 ft 4 in, 163.83 cm)    Systolic Blood Pressure :   198 mmHg (HI)    Diastolic Blood Pressure :   95 mmHg (HI)    Mean Arterial Pressure :   129 mmHg   Peripheral Pulse Rate :   72 bpm   Pulse Site :   Radial artery   Respiratory Rate :   20 br/min   Oxygen Saturation :   97 %   Timbo Steinberg CMA - 12/17/2021 4:20 PM CST   Health Status   Allergies Verified? :   Yes   Medication History Verified? :   Yes   Immunizations Current :   Yes   Medical History Verified? :   Yes   Pre-Visit Planning Status :   Not completed   Tobacco Use? :   Never smoker   Timbo Steinberg CMA - 12/17/2021 4:20 PM CST   Meds / Allergies   (As Of: 12/17/2021 4:32:05 PM CST)   Allergies (Active)   acetaminophen  Estimated Onset Date:   Unspecified ; Reactions:   nausea and vomiting ; Created By:   Davina Santana; Reaction Status:   Active ; Category:   Drug ; Substance:   acetaminophen ; Type:   Allergy ; Updated By:   Davina Santana; Reviewed Date:   12/17/2021 4:25 PM CST      Aciphex  Estimated Onset Date:   Unspecified ; Reactions:   Upset stomach ; Created By:   Gina Padilla; Reaction Status:   Active ; Category:   Drug ; Substance:   Aciphex ; Type:   Allergy ; Updated By:   Gina Padilla; Reviewed Date:   12/17/2021 4:25 PM CST      codeine  Estimated Onset Date:   Unspecified ; Reactions:   Vomit ; Created By:   Angelique Singleton; Reaction Status:   Active ; Category:   Drug ; Substance:   codeine ; Type:   Allergy ; Updated " By:   Angelique Singleton; Reviewed Date:   12/17/2021 4:25 PM CST      HYDROcodone  Estimated Onset Date:   Unspecified ; Reactions:   nausea and vomiting ; Created By:   Davina Santana; Reaction Status:   Active ; Category:   Drug ; Substance:   HYDROcodone ; Type:   Allergy ; Updated By:   Davina Santana; Reviewed Date:   12/17/2021 4:25 PM CST      traMADol  Estimated Onset Date:   Unspecified ; Reactions:   Vomit ; Created By:   Abhishek Song CMA; Reaction Status:   Active ; Category:   Drug ; Substance:   traMADol ; Type:   Allergy ; Updated By:   Abhishek Song CMA; Reviewed Date:   12/17/2021 4:25 PM CST        Medication List   (As Of: 12/17/2021 4:32:05 PM CST)   Prescription/Discharge Order    losartan  :   losartan ; Status:   Prescribed ; Ordered As Mnemonic:   losartan 50 mg oral tablet ; Simple Display Line:   50 mg, 1 tab(s), Oral, daily, 90 tab(s), 2 Refill(s) ; Ordering Provider:   Mg Aguiar MD; Catalog Code:   losartan ; Order Dt/Tm:   9/13/2021 12:44:36 PM CDT          atorvastatin  :   atorvastatin ; Status:   Prescribed ; Ordered As Mnemonic:   atorvastatin 20 mg oral tablet ; Simple Display Line:   1 tab(s), Oral, daily, 90 tab(s), 2 Refill(s) ; Ordering Provider:   Mg Aguiar MD; Catalog Code:   atorvastatin ; Order Dt/Tm:   9/13/2021 12:43:25 PM CDT          omeprazole  :   omeprazole ; Status:   Prescribed ; Ordered As Mnemonic:   omeprazole 20 mg oral delayed release capsule ; Simple Display Line:   20 mg, 1 cap(s), Oral, daily, 90 cap(s), 2 Refill(s) ; Ordering Provider:   Mg Aguiar MD; Catalog Code:   omeprazole ; Order Dt/Tm:   9/13/2021 12:43:46 PM CDT          calcium carbonate  :   calcium carbonate ; Status:   Prescribed ; Ordered As Mnemonic:   calcium carbonate 500 mg (200 mg elemental calcium) oral tablet, chewable ; Simple Display Line:   500 mg, 1 tab(s), Chewed, bid, 120 tab(s), 0 Refill(s) ; Ordering Provider:   Mg Aguiar MD; Catalog Code:   calcium carbonate ; Order  "Dt/Tm:   1/14/2021 2:16:45 PM CST            Home Meds    ibuprofen  :   ibuprofen ; Status:   Documented ; Ordered As Mnemonic:   ibuprofen ; Simple Display Line:   po, prn, PRN: as needed for pain, 0 Refill(s) ; Catalog Code:   ibuprofen ; Order Dt/Tm:   2/14/2017 1:21:00 PM CST            Social History   Social History   (As Of: 12/17/2021 4:32:05 PM CST)   Alcohol:        Past   (Last Updated: 9/15/2017 9:34:39 AM CDT by Ines Iyer)          Tobacco:        Never (less than 100 in lifetime)   (Last Updated: 12/17/2021 4:21:39 PM CST by Timbo Steinberg CMA)          Electronic Cigarette/Vaping:        Electronic Cigarette Use: Never.   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   (Last Updated: 12/19/2012 1:22:59 PM CST by Gina Padilla)   Work/School description: teacher.   (Last Updated: 12/10/2010 10:19:59 AM CST by Alejandro Wheeler MD)          Home/Environment:        Marital status: .  Spouse/Partner name: iHmanshu.   (Last Updated: 12/21/2012 10:58:31 AM CST by Chelo Buchanan CMA)          Nutrition/Health:        Type of diet: Regular.   (Last Updated: 12/31/2013 3:33:43 PM CST by Ines Ramos MA)          Exercise:         Comments:  9/14/2017 8:52 AM - Chelo Gonzales: Walking, biking, \"some leg and back weights\".  Five times per week.   (Last Updated: 9/14/2017 8:52:23 AM CDT by Chelo Gonzales)          Other:        Marital status,    (Last Updated: 12/8/2010 1:34:08 PM CST by Angelique Singleton)  "

## 2022-02-16 NOTE — PROGRESS NOTES
Patient:   SURJIT BOURNE            MRN: [id number]            FIN: [id number]               Age:   77 years     Sex:  Female     :  1940   Associated Diagnoses:   1st degree AV block   Author:   Mg Aguiar MD      Procedure   EKG procedure   Indication: bradycardia.     Position: supine.     EKG findings   Interpretation: by primary care provider.     Rhythm: heart rate  64  beats/min, sinus (normal, not bradycardia).     Axis: normal axis, normal configuration.     Within normal limits.     Intervals: NV, QRS normal, QT normal, 1st degree AV block.     Normal EKG.     P waves: normal.     QRS complex: normal, no Q waves present.     ST-T-U complex: normal.     Interpretation: no ST-T wave abnormalities.     Discussed: with patient.        Impression and Plan   Diagnosis     1st degree AV block (UMV00-SF I44.0).     Orders   no high grade block

## 2022-02-16 NOTE — NURSING NOTE
Comprehensive Intake Entered On:  2021 2:00 PM CST    Performed On:  2021 1:51 PM CST by Chelo Buchanan CMA               Summary   Chief Complaint :   1.  f/u BP   2.  continued cough with certain foods    Advance Directive :   Yes   Weight Measured :   163 lb(Converted to: 163 lb 0 oz, 73.936 kg)    Height Measured :   64.5 in(Converted to: 5 ft 4 in, 163.83 cm)    Body Mass Index :   27.54 kg/m2 (HI)    Body Surface Area :   1.83 m2   Height/Length Estimated :   64.5 in(Converted to: 5 ft 4 in, 163.83 cm)    Systolic Blood Pressure :   172 mmHg (HI)    Diastolic Blood Pressure :   68 mmHg   Mean Arterial Pressure :   103 mmHg   Peripheral Pulse Rate :   64 bpm   Temperature Tympanic :   96.7 DegF(Converted to: 35.9 DegC)  (LOW)    Oxygen Saturation :   96 %   Chelo Buchanan CMA - 2021 1:51 PM CST   Health Status   Allergies Verified? :   Yes   Medication History Verified? :   Yes   Immunizations Current :   Yes   Medical History Verified? :   Yes   Pre-Visit Planning Status :   Completed   Tobacco Use? :   Never smoker   Chelo Buchanan CMA - 2021 1:51 PM CST   ID Risk Screen   Recent Travel History :   No recent travel   Family Member Travel History :   No recent travel   Other Exposure to Infectious Disease :   Unknown   Chelo Buchanan CMA - 2021 1:51 PM CST   Procedures / Surgeries        -    Procedure History   (As Of: 2021 2:00:51 PM CST)     Anesthesia Minutes:   0 ; Procedure Name:   Childbirth ; Procedure Minutes:   0 ; Comments:     2010 1:31 PM Angelique Montemayor   3, Para 3            Procedure Dt/Tm:    ; Anesthesia Minutes:   0 ; Procedure Name:   Cholecystectomy ; Procedure Minutes:   0            Procedure Dt/Tm:    ; Anesthesia Minutes:   0 ; Procedure Name:   Cataract extraction ; Procedure Minutes:   0 ; Comments:     2010 1:30 PM Angelique Montemayor 2006. R.             Procedure Dt/Tm:   1998 ; Anesthesia Minutes:   0 ; Procedure  Name:   Vaginal hysterectomy ; Procedure Minutes:   0            Procedure Dt/Tm:   10/6/2005 ; Anesthesia Minutes:   0 ; Procedure Name:   Colonoscopy ; Procedure Minutes:   0 ; Comments:     12/21/2012 10:03 AM CST - Dewayne SCHILLINGChelo  Normal - no polyps/no masses ; Last Reviewed Dt/Tm:   12/21/2012 10:03:52 AM CST            Procedure Dt/Tm:   07/2001 ; Anesthesia Minutes:   0 ; Procedure Name:   excision of thyroid nodule ; Procedure Minutes:   0            Anesthesia Minutes:   0 ; Procedure Name:   Blepharoplasty of upper eyelid ; Procedure Minutes:   0 ; Comments:     12/19/2012 1:19 PM CST - Valerie , Gina  Ptosis            Procedure Dt/Tm:   9/23/2005 ; Anesthesia Minutes:   0 ; Procedure Name:   Excision left cheek. ; Procedure Minutes:   0            Procedure Dt/Tm:   09/2009 ; Anesthesia Minutes:   0 ; Procedure Name:   Bone density scan ; Procedure Minutes:   0 ; Last Reviewed Dt/Tm:   8/14/2013 10:59:32 AM CDT            Anesthesia Minutes:   0 ; Procedure Name:   Hysterectomy ; Procedure Minutes:   0 ; Comments:     11/18/2014 3:37 PM [facility] - Davina Santana  With one ovary remaining.            Procedure Dt/Tm:   10/2014 ; Anesthesia Minutes:   0 ; Procedure Name:   Colonoscopy ; Procedure Minutes:   0 ; Comments:     9/30/2016 12:58 PM CDT - Dewayne SCHILLINGChelo  few diverticula, otherwise normal ; Last Reviewed Dt/Tm:   9/30/2016 12:58:58 PM CDT            Procedure Dt/Tm:   10/2014 ; Anesthesia Minutes:   0 ; Procedure Name:   Upper GI endoscopy ; Procedure Minutes:   0 ; Last Reviewed Dt/Tm:   9/30/2016 12:59:18 PM CDT            Anesthesia Minutes:   0 ; Procedure Name:   Release of trigger thumb ; Procedure Minutes:   0 ; Comments:     9/13/2017 1:04 PM CDT - [place] Chelo SCHILLING  right ; Last Reviewed Dt/Tm:   9/13/2017 1:04:12 PM CDT            Procedure Dt/Tm:   01/2020 ; Anesthesia Minutes:   0 ; Procedure Name:   Release of trigger finger ; Procedure Minutes:   0 ; Comments:     12/3/2020 10:43  [facility] CST - Dewayne SCHILLING, Chelo  left finger ; Last Reviewed Dt/Tm:   12/3/2020 10:43:56 AM CST

## 2022-02-16 NOTE — RESULTS
Patient:   SURJIT BOURNE            MRN: [id number]            FIN: [id number]               Age:   81 years     Sex:  Female     :  1940   Associated Diagnoses:   None   Author:   Dameon Arboleda MD      Procedure   EKG procedure   Date:  2021.     Indication: weakness.     EKG findings   No change from prior EKG: since  2021.     Rhythm: heart rate  67  beats/min, sinus normal, heart block first degree.     Axis: left axis deviation unchanged.     Intervals: normal.     P waves: normal.     QRS complex: LVH.     ST-T-U complex: normal.     Interpretation: No change from previous ECG, abnormal ECG.

## 2022-02-16 NOTE — TELEPHONE ENCOUNTER
---------------------  From: Macey Prieto RN (Phone Messages Pool ([id number]_[facility]))   Sent: 9/13/2021 12:47:26 PM CDT  Subject: General Message       PCP:  ASHLEY      Time of Call: 12:16pm       Person Calling: pt  Phone number:  [phone]    Returned call: 12:40pm    Note:  VM received from pt, stating she needs refills of her medications as she is going on a trip. Pt requested to be sent to [facility].    OV 9/10/21 with [facility]  Losartan, Atorvastatin, Omeprazole filled.    Last office visit and reason: 9/10/21 f/u [facility]

## 2022-02-16 NOTE — PROGRESS NOTES
Patient:   SURJIT BOURNE            MRN: [id number]            FIN: [id number]               Age:   77 years     Sex:  Female     :  1940   Associated Diagnoses:   Iron deficiency anemia; Esophageal Reflux Disease; Osteoporosis; Hypertension   Author:   Mg Aguiar MD      Visit Information      Date of Service: 2018 02:47 pm  Performing Location: [facility]  Encounter#: [id number]      Primary Care Provider (PCP):  Mg Aguiar MD    NPI# [id number]      Referring Provider:  Mg Aguiar MD    NPI# [id number]      Chief Complaint   5/3/2018 2:59 PM CDT     discuss meds - feels like her heart is pounding with the amlodipine, but iHR has been in the mid to high 40's              Additional Information:No additional information recorded during visit.   Chief complaint and symptoms as noted above and confirmed with patient.  Recent lab and diagnostic studies reviewed with patient      History of Present Illness     2018: Jo Ann returns for general follow-up.  Overall feeling okay.  She says she feels more weak and tired than she remembers.  Asked about last available iron and hemoglobin levels from this past fall.  Denies any reflux symptoms.  No stool changes.  Generally describes having issues with swallowing pills feeling like it lodged in her throat.  No other dysphasia symptoms.  Not checking blood pressures at all at home.    3/15/2018: Return to clinic for follow-up related to her blood pressure.  She has been maintaining a home blood pressure log over the past several weeks.  Home systolic pressures typically ranging from 160 to high 140s.  Currently not on medication therapy.  She is leaving for [place] this coming week.    3/29/2018: Jo Ann presents to clinic originally accompanied Parker for his visit.  She had blood pressure checked by nursing staff and during routine check was noted to have an heart rate of 36 which was confirmed by manual measurement.  She recently started on  amlodipine after returning from the cruise.  She is taken a total of 3 days.  Otherwise feeling well.  No syncopal events.  Uneventful cruise    5/3/2018: Returns to clinic for follow-up related to her hypertension.  She has remained on amlodipine.  However describes a frequent pounding like sensation in her chest primarily when at rest or trying to sleep.  She feels like her heart was pounding out of her chest.  She will check her pulse rate and blood pressure at home and states that heart rates typically in the 40s or 50s.  Nevers measures any tachycardia.  Denies any presyncopal symptoms or chest pain.  No limitations of activity and states feeling typically goes away at that time.  She associates the timeframe of symptoms since being on amlodipine         Review of Systems   Constitutional:  No fever, No chills, No weakness, No fatigue.    Eye:  Negative except as documented in history of present illness.    Ear/Nose/Mouth/Throat:  Negative except as documented in history of present illness.    Respiratory:  No shortness of breath, No cough.    Cardiovascular:  Palpitations, No chest pain, No peripheral edema, No syncope.    Gastrointestinal:  No nausea, No vomiting, No abdominal pain.    Genitourinary:  No dysuria, No hematuria.    Hematology/Lymphatics:  Negative except as documented in history of present illness.    Endocrine:  No excessive thirst, No polyuria.    Immunologic:  No recurrent fevers.    Musculoskeletal:  No joint pain, No muscle pain.    Neurologic:  Alert and oriented X4, No numbness, No tingling, No headache.       Health Status   Allergies:    Allergic Reactions (Selected)  Severity Not Documented  Acetaminophen (Nausea and vomiting)  Aciphex (Upset stomach)  Codeine (Vomit)  HYDROcodone (Nausea and vomiting)  TraMADol (Vomit)   Medications:  (Selected)   Prescriptions  Prescribed  amLODIPine 5 mg oral tablet: 1 tab(s) ( 5 mg ), PO, Daily, # 90 tab(s), 3 Refill(s), Type: Maintenance,  [id number], 1 tab(s) po daily  atorvastatin 20 mg oral tablet: See Instructions, Instructions: TAKE 1 TABLET EVERY DAY, # 90 tab(s), 1 Refill(s), Type: Soft Stop, [id number], TAKE 1 TABLET EVERY DAY  omeprazole 20 mg oral delayed release capsule: 1 cap(s) ( 20 mg ), PO, Daily, # 90 cap(s), 3 Refill(s), Type: Maintenance, [id number], 1 cap(s) po daily  Documented Medications  Documented  OsCal 500 oral tablet, chewable: 3 tab(s) ( 3,750 mg ), daily, Instructions: contains vitamin D3 600IU, 0 Refill(s), Type: Maintenance  ibuprofen: po, prn, PRN: as needed for pain, 0 Refill(s), Type: Maintenance   Problem list:    All Problems  Blood pressure elevated / SNOMED CT 775963242 / Confirmed  Esophageal reflux disease / SNOMED CT 837685941 / Confirmed  History of chickenpox / SNOMED CT 799859034 / Confirmed  Iron deficiency anemia / SNOMED CT 002453021 / Confirmed  Obesity / SNOMED CT 0289753931 / Probable  OA (osteoarthritis) / SNOMED CT 2238816937 / Confirmed  Osteoporosis / SNOMED CT 460282735 / Confirmed  Pure hypercholesterolemia / SNOMED CT 539151889 / Confirmed  Inactive: Thyroid nodule / SNOMED CT 691157872  Resolved: Acute lower UTI (urinary tract infection) / SNOMED CT 5360423494  Resolved: Bilateral carpal tunnel syndrome / SNOMED CT 61870320  Resolved: Head trauma / SNOMED CT 744541848  Resolved: Pregnancy / SNOMED CT 174852857  Resolved: Pregnancy / SNOMED CT 724454555  Resolved: Pregnancy / SNOMED CT 173975374  Resolved: Seizures / SNOMED CT 816984997  Canceled: Obesity / ICD-9-.00      Histories   Past Medical History:    Active  OA (osteoarthritis) (4750772068): Onset in 2006 at 66 years.  Comments:  12/19/2012 CST 1:20 PM CST - Gina Padilla  Right knee  Esophageal reflux disease (432896252)  Obesity (4407597632)  Blood pressure elevated (990635648)  Iron deficiency anemia (568236600)  History of chickenpox (452393807)  Resolved  Seizures  (373242402): Onset in  at 20 years.  Resolved.  Comments:  2012 CST 10:13 AM CST - Davina Santana  Of unknown origin.  Acute lower UTI (urinary tract infection) (0213114581):  Resolved.  Bilateral carpal tunnel syndrome (69483214):  Resolved.  Head trauma (166013040):  Resolved.  Comments:  2014 CST 3:38 PM CST - Davina Santana  Fell back on ice with head wound.  No sequelae.  Pregnancy (111426463):  Resolved in  at 26 years.  Pregnancy (084819639):  Resolved in  at 28 years.  Pregnancy (595407395):  Resolved in  at 30 years.   Family History:    Cancer  Father ()  Comments:  2012 10:08 AM - Davina Santana  Prostate cancer.    2010 1:33 PM - Angelique Singleton  Stomach, Prostate cancer  Asthma  Father ()  Breast cancer  Mother ()  Sister (Guadalupe)  Sister (Nereida)  Hypertension  Mother ()  High blood pressure  Sister (Guadalupe)  Sister (Nereida)  Congestive heart failure  Mother ()  Comments:  2012 10:18 AM - Davina Santana  Cunningham's lung.  Seasonal allergy  Daughter  Son (Faraz)     Procedure history:    Colonoscopy (760254915) in the month of 10/2014 at 73 Years.  Comments:  2016 12:58 PM - Chelo Buchanan CMA  few diverticula, otherwise normal  Upper GI endoscopy (2925736163) in the month of 10/2014 at 73 Years.  Bone density scan (313002433) in the month of 2009 at 68 Years.  Cataract extraction (99284556) in  at 66 Years.  Comments:  2010 1:30 PM - Angelique Singleton 2006. R. 2007  Colonoscopy (427295989) on 10/6/2005 at 64 Years.  Comments:  2012 10:03 AM - Chelo Buchanan CMA  Normal - no polyps/no masses  Excision left cheek. on 2005 at 64 Years.  excision of thyroid nodule in the month of 2001 at 60 Years.  Vaginal hysterectomy (187473235) in the month of 1998 at 58 Years.  Cholecystectomy (38162682) in 1982 at 42 Years.  Childbirth (1633125105).  Comments:  2010 1:31 PM - Angelique Singleton   3, Para  "3  Blepharoplasty of upper eyelid (12322319).  Comments:  12/19/2012 1:19 PM - Gina Padilla  Ptosis  Hysterectomy (832786169).  Comments:  11/18/2014 3:37 PM - Davina Santana  With one ovary remaining.  Release of trigger thumb (456924364).  Comments:  9/13/2017 1:04 PM - Dewayne SCHILLINGChelo  right   Social History:        Alcohol Assessment            Past      Tobacco Assessment            Never      Substance Abuse Assessment            Never      Employment and Education Assessment            Work/School description: teacher.            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      Home and Environment Assessment            Marital status: .  Spouse/Partner name: Himanshu.      Nutrition and Health Assessment            Type of diet: Regular.      Exercise and Physical Activity Assessment                     Comments:                      09/14/2017 - Chelo Gonzales                     Walking, biking, \"some leg and back weights\".  Five times per week.      Other Assessment            Marital status,         Physical Examination   vital signs stable, as noted above   Vital Signs   5/3/2018 2:59 PM CDT Temperature Tympanic 97.3 DegF  LOW    Peripheral Pulse Rate 64 bpm    Pulse Site Brachial artery    HR Method Electronic    Systolic Blood Pressure 155 mmHg  HI    Diastolic Blood Pressure 70 mmHg    Mean Arterial Pressure 98 mmHg    BP Site Right arm    BP Method Electronic    BP Systolic Repeat 151 mmHg    BP Diastolic Repeat 75 mmHg      Measurements from flowsheet : Measurements   5/3/2018 2:59 PM CDT     Weight Measured - Standard                163.8 lb     General:  Alert and oriented, No acute distress.    Respiratory:  Lungs are clear to auscultation, Respirations are non-labored.    Cardiovascular:  Normal rate, Regular rhythm, No murmur.    Neurologic:  Alert, Oriented.    Cognition and Speech:  Oriented, Speech clear and coherent.    Psychiatric:  Appropriate mood & " affect.       Review / Management   Results review:  Lab results   3/29/2018 3:14 PM CDT Sodium Level 135 mmol/L    Potassium Level 4.1 mmol/L    Chloride Level 101 mmol/L    CO2 Level 27 mmol/L    Glucose Level 87 mg/dL    BUN 15 mg/dL    Creatinine 0.98 mg/dL  HI    BUN/Creat Ratio 15    eGFR 55 mL/min/1.73m2  LOW    eGFR African American 64 mL/min/1.73m2    Calcium Level 9.4 mg/dL    TSH 2.49 mIU/L   2/6/2018 9:23 AM CST Cholesterol 166 mg/dL    Non-HDL 91    HDL 75 mg/dL    Chol/HDL Ratio 2.2    LDL 77    Triglyceride 68 mg/dL   .       Impression and Plan   Diagnosis     Iron deficiency anemia (FER79-XR D50.0).     Esophageal Reflux Disease (BOO58-TX K21.9).     Osteoporosis (BHT08-JT M81.0).     Hypertension (CXE59-FW I10).         .) palpitations   - previous ECG not showing any dysrhythmia at last visit   - advised to stop amlodipine to see if any relationship to symptoms   - if in 2 weeks, still have symptoms, then would get set up for home cardiac monitor for 24 hrs.     .) hypertension, controlled  current antihypertensive regimen: stopping amlodipine  regimen changes: none  intolerance:  future titration/work-up plan:    - BP CSS reasessment in 2 weeks   - BP goal <140/90    plan as previously outlined:     .) GERD, esophagitis   - grade C; EGD in '14 ([facility])   - pathology showing no evidence of Barretts esophagus   - maintained on omeprazole 20mg daily   - recommending H2 blocker for any breakthrough symptoms    .) h/o severe iron deficiency anemia - resolved   - Hgb 6.2 ('14) - related to esophagitis, normal colonocopy in '14   - Hgb since normalized   - monitor CBC annually    .) health maintenance   - osteoporosis based on '16 DEXA     - on calcium/vitamin D daily     - I'd like to begin on Reclast 5mg IV annually (historical intolerance to oral bisphosphonate therapy)   - mixed hyperlipidemia; high HDL and LDL    - previously recommended coronary calcium scoring - did not complete    RTC as  previously scheduled

## 2022-02-16 NOTE — NURSING NOTE
Vital Signs Entered On:  12/22/2021 1:09 PM CST    Performed On:  12/22/2021 1:09 PM CST by Chelo Buchanan CMA               Vital Signs   Systolic Blood Pressure :   205 mmHg (HI)    Diastolic Blood Pressure :   82 mmHg (HI)    Mean Arterial Pressure :   123 mmHg   BP Site :   Left arm   Chelo Buchanan CMA - 12/22/2021 1:09 PM CST

## 2022-02-16 NOTE — PROGRESS NOTES
Patient:   SURJIT BOURNE            MRN: [id number]            FIN: [id number]               Age:   77 years     Sex:  Female     :  1940   Associated Diagnoses:   Iron deficiency anemia; Esophageal Reflux Disease; Osteoporosis; Hypertension   Author:   Mg Aguiar MD      Visit Information      Date of Service: 2018 02:44 pm  Performing Location: [facility]  Encounter#: [id number]      Primary Care Provider (PCP):  Mg Aguiar MD    NPI# [id number]      Referring Provider:  Mg Aguiar MD    NPI# [id number]      Chief Complaint   3/29/2018 2:41 PM CDT    How HR, does c/o of fatigue - just got back from vacation Monday.  Has taken 3 doses of amlodipine              Additional Information:No additional information recorded during visit.   Chief complaint and symptoms as noted above and confirmed with patient.  Recent lab and diagnostic studies reviewed with patient      History of Present Illness     2018: Jo Ann returns for general follow-up.  Overall feeling okay.  She says she feels more weak and tired than she remembers.  Asked about last available iron and hemoglobin levels from this past fall.  Denies any reflux symptoms.  No stool changes.  Generally describes having issues with swallowing pills feeling like it lodged in her throat.  No other dysphasia symptoms.  Not checking blood pressures at all at home.    3/15/2018: Return to clinic for follow-up related to her blood pressure.  She has been maintaining a home blood pressure log over the past several weeks.  Home systolic pressures typically ranging from 160 to high 140s.  Currently not on medication therapy.  She is leaving for [place] this coming week.    3/29/2018: Jo Ann presents to clinic originally accompanied Parker for his visit.  She had blood pressure checked by nursing staff and during routine check was noted to have an heart rate of 36 which was confirmed by manual measurement.  She recently started on amlodipine  after returning from the cruise.  She is taken a total of 3 days.  Otherwise feeling well.  No syncopal events.  Uneventful cruise         Review of Systems   Constitutional:  No fever, No chills, No weakness, No fatigue.    Eye:  Negative except as documented in history of present illness.    Ear/Nose/Mouth/Throat:  Negative except as documented in history of present illness.    Respiratory:  No shortness of breath, No cough.    Cardiovascular:  No chest pain, No palpitations, No peripheral edema, No syncope.    Gastrointestinal:  No nausea, No vomiting, No abdominal pain.    Genitourinary:  No dysuria, No hematuria.    Hematology/Lymphatics:  Negative except as documented in history of present illness.    Endocrine:  No excessive thirst, No polyuria.    Immunologic:  No recurrent fevers.    Musculoskeletal:  No joint pain, No muscle pain.    Neurologic:  Alert and oriented X4, No numbness, No tingling, No headache.       Health Status   Allergies:    Allergic Reactions (Selected)  Severity Not Documented  Acetaminophen (Nausea and vomiting)  Aciphex (Upset stomach)  Codeine (Vomit)  HYDROcodone (Nausea and vomiting)  TraMADol (Vomit)   Medications:  (Selected)   Prescriptions  Prescribed  amLODIPine 5 mg oral tablet: 1 tab(s) ( 5 mg ), PO, Daily, # 90 tab(s), 3 Refill(s), Type: Maintenance, Pharmacy: [facility], 1 tab(s) po daily  atorvastatin 20 mg oral tablet: See Instructions, Instructions: TAKE 1 TABLET EVERY DAY, # 90 tab(s), 1 Refill(s), Type: Soft Stop, Pharmacy[id number], TAKE 1 TABLET EVERY DAY  omeprazole 20 mg oral delayed release capsule: 1 cap(s) ( 20 mg ), PO, Daily, # 90 cap(s), 3 Refill(s), Type: Maintenance, [id number], 1 cap(s) po daily  Documented Medications  Documented  OsCal 500 oral tablet, chewable: 3 tab(s) ( 3,750 mg ), daily, Instructions: contains vitamin D3 600IU, 0 Refill(s), Type: Maintenance  ibuprofen: po, prn, PRN: as needed for pain, 0  Refill(s), Type: Maintenance   Problem list:    All Problems  Blood pressure elevated / SNOMED CT 820010717 / Confirmed  Esophageal reflux disease / SNOMED CT 587562164 / Confirmed  History of chickenpox / SNOMED CT 358829231 / Confirmed  Iron deficiency anemia / SNOMED CT 850163547 / Confirmed  Obesity / SNOMED CT 2665718612 / Probable  OA (osteoarthritis) / SNOMED CT 4296309761 / Confirmed  Osteoporosis / SNOMED CT 779333612 / Confirmed  Pure hypercholesterolemia / SNOMED CT 901951907 / Confirmed  Inactive: Thyroid nodule / SNOMED CT 120980499  Resolved: Acute lower UTI (urinary tract infection) / SNOMED CT 3283350127  Resolved: Bilateral carpal tunnel syndrome / SNOMED CT 69968216  Resolved: Head trauma / SNOMED CT 904826821  Resolved: Pregnancy / SNOMED CT 886813166  Resolved: Pregnancy / SNOMED CT 671152040  Resolved: Pregnancy / SNOMED CT 529433854  Resolved: Seizures / SNOMED CT 316501347  Canceled: Obesity / ICD-9-.00      Histories   Past Medical History:    Active  OA (osteoarthritis) (4582053466): Onset in  at 66 years.  Comments:  2012 CST 1:20 PM Gina Harmon  Right knee  Esophageal reflux disease (041478955)  Obesity (9606103519)  Blood pressure elevated (951756946)  Iron deficiency anemia (482949764)  History of chickenpox (382645872)  Resolved  Seizures (386343798): Onset in  at 20 years.  Resolved.  Comments:  2012 CST 10:13 AM Davina Mccann  Of unknown origin.  Acute lower UTI (urinary tract infection) (4705252831):  Resolved.  Bilateral carpal tunnel syndrome (18030509):  Resolved.  Head trauma (782725940):  Resolved.  Comments:  2014 CST 3:38 PM Davina Mccann  Fell back on ice with head wound.  No sequelae.  Pregnancy (816109192):  Resolved in  at 26 years.  Pregnancy (070389954):  Resolved in  at 28 years.  Pregnancy (811294761):  Resolved in  at 30 years.   Family History:    Cancer  Father ()  Comments:  2012 10:08  AM - Max Conchaan  Prostate cancer.    2010 1:33 PM - Angelique Singleton  Stomach, Prostate cancer  Asthma  Father ()  Breast cancer  Mother ()  Sister (Guadalupe)  Sister (Nereida)  Hypertension  Mother ()  High blood pressure  Sister (Guadalupe)  Sister (Nereida)  Congestive heart failure  Mother ()  Comments:  2012 10:18 AM - Max  Davina  Cunningham's lung.  Seasonal allergy  Daughter  Son (Faraz)     Procedure history:    Colonoscopy (798350153) in the month of 10/2014 at 73 Years.  Comments:  2016 12:58 PM - Dewayne Chelo SCHILLING  few diverticula, otherwise normal  Upper GI endoscopy (0726882478) in the month of 10/2014 at 73 Years.  Bone density scan (373711098) in the month of 2009 at 68 Years.  Cataract extraction (76024385) in  at 66 Years.  Comments:  2010 1:30 PM - Angelique Singleton 2006. R. 2007  Colonoscopy (761929116) on 10/6/2005 at 64 Years.  Comments:  2012 10:03 AM - Dewayne Chelo SCHILLING  Normal - no polyps/no masses  Excision left cheek. on 2005 at 64 Years.  excision of thyroid nodule in the month of 2001 at 60 Years.  Vaginal hysterectomy (597825608) in the month of 1998 at 58 Years.  Cholecystectomy (06057498) in  at 42 Years.  Childbirth (1601227012).  Comments:  2010 1:31 PM - Angelique Singleton   3, Para 3  Blepharoplasty of upper eyelid (79199824).  Comments:  2012 1:19 PM - Gina Padilla  Ptosis  Hysterectomy (595251379).  Comments:  2014 3:37 PM - Davina Santana  With one ovary remaining.  Release of trigger thumb (682358014).  Comments:  2017 1:04 PM - Dewayne Chelo SCHILLING  right   Social History:        Alcohol Assessment            Past      Tobacco Assessment            Never      Substance Abuse Assessment            Never      Employment and Education Assessment            Work/School description: teacher.            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      Home and  "Environment Assessment            Marital status: .  Spouse/Partner name: Himanshu.      Nutrition and Health Assessment            Type of diet: Regular.      Exercise and Physical Activity Assessment                     Comments:                      09/14/2017 - Chelo Gonzales                     Walking, biking, \"some leg and back weights\".  Five times per week.      Other Assessment            Marital status,         Physical Examination   vital signs stable, as noted above   Vital Signs   3/29/2018 2:41 PM CDT Peripheral Pulse Rate 35 bpm  LOW    Systolic Blood Pressure 149 mmHg  HI    Diastolic Blood Pressure 71 mmHg    Mean Arterial Pressure 97 mmHg    BP Site Right arm    BP Method Electronic    BP Systolic Repeat 140 mmHg    BP Diastolic Repeat 70 mmHg    Additional Vitals Info HR 96 - manual   3/29/2018 2:24 PM CDT Peripheral Pulse Rate 35 bpm  LOW    Pulse Site Brachial artery    Systolic Blood Pressure 149 mmHg  HI    Diastolic Blood Pressure 71 mmHg    Mean Arterial Pressure 97 mmHg    BP Site Right arm    BP Systolic Repeat 140 mmHg    BP Diastolic Repeat 70 mmHg    Vital Signs Comments pulse - 36      General:  Alert and oriented, No acute distress.    Respiratory:  Lungs are clear to auscultation, Respirations are non-labored.    Cardiovascular:  Normal rate, No murmur, No edema, bradycardia; HR ausculated in 40s.    Neurologic:  Alert, Oriented.    Cognition and Speech:  Oriented, Speech clear and coherent.    Psychiatric:  Appropriate mood & affect.       Review / Management   Results review:  Lab results   2/6/2018 9:23 AM CST Cholesterol 166 mg/dL    Non-HDL 91    HDL 75 mg/dL    Chol/HDL Ratio 2.2    LDL 77    Triglyceride 68 mg/dL   .       Impression and Plan   Diagnosis     Iron deficiency anemia (AED62-GA D50.0).     Esophageal Reflux Disease (WKG59-AS K21.9).     Osteoporosis (GEI96-MM M81.0).     Hypertension (CWI44-SG I10).           .) bradycardia?  ECG: NSR with HR in 64 with " 1st degree AV block  will check BMP and TSH  - unclear etiology of transient bradycardia? - doubt any relationship with amlodipine  - will have her follow-up for vitals recheck next week    .) hypertension, controlled  current antihypertensive regimen: amlodipine 5mg daily   regimen changes: none  intolerance:  future titration/work-up plan:     plan as previously outlined:     .) GERD, esophagitis   - grade C; EGD in '14 ([facility])   - pathology showing no evidence of Barretts esophagus   - maintained on omeprazole 20mg daily   - recommending H2 blocker for any breakthrough symptoms    .) h/o severe iron deficiency anemia - resolved   - Hgb 6.2 ('14) - related to esophagitis, normal colonocopy in '14   - Hgb since normalized   - monitor CBC annually    .) health maintenance   - osteoporosis based on '16 DEXA     - on calcium/vitamin D daily     - I'd like to begin on Reclast 5mg IV annually (historical intolerance to oral bisphosphonate therapy)   - mixed hyperlipidemia; high HDL and LDL    - previously recommended coronary calcium scoring - did not complete    RTC as previously scheduled

## 2022-02-16 NOTE — PROGRESS NOTES
Patient:   SURJIT BOURNE            MRN: [id number]            FIN: [id number]               Age:   77 years     Sex:  Female     :  1940   Associated Diagnoses:   Iron deficiency anemia; Esophageal Reflux Disease; Osteoporosis   Author:   Mg Aguiar MD      Visit Information      Date of Service: 2018 12:54 pm  Performing Location: [facility]  Encounter#: [id number]      Primary Care Provider (PCP):  Mg Aguiar MD    NPI# [id number]      Referring Provider:  Felicity Aguiar MD    NPI# [id number]      Chief Complaint   2018 1:19 PM CST    6 month check - has been feeling tired, some dizziness  (Modified)             Additional Information:No additional information recorded during visit.   Chief complaint and symptoms as noted above and confirmed with patient.  Recent lab and diagnostic studies reviewed with patient      History of Present Illness     2018: Jo Ann returns for general follow-up.  Overall feeling okay.  She says she feels more weak and tired than she remembers.  Asked about last available iron and hemoglobin levels from this past fall.  Denies any reflux symptoms.  No stool changes.  Generally describes having issues with swallowing pills feeling like it lodged in her throat.  No other dysphasia symptoms.  Not checking blood pressures at all at home.         Review of Systems   Constitutional:  Weakness, Fatigue, No fever, No chills.    Eye:  Negative except as documented in history of present illness.    Ear/Nose/Mouth/Throat:  Negative except as documented in history of present illness.    Respiratory:  Cough, No shortness of breath.    Cardiovascular:  No chest pain, No palpitations, No peripheral edema, No syncope.    Gastrointestinal:  No nausea, No vomiting, No abdominal pain.    Genitourinary:  No dysuria, No hematuria.    Hematology/Lymphatics:  Negative except as documented in history of present illness.    Endocrine:  No excessive thirst, No polyuria.     Immunologic:  No recurrent fevers.    Musculoskeletal:  Muscle pain, No joint pain.    Neurologic:  Alert and oriented X4, No numbness, No tingling, No headache.       Health Status   Allergies:    Allergic Reactions (Selected)  Severity Not Documented  Acetaminophen (Nausea and vomiting)  Aciphex (Upset stomach)  Codeine (Vomit)  HYDROcodone (Nausea and vomiting)  TraMADol (Vomit)   Medications:  (Selected)   Prescriptions  Prescribed  atorvastatin 20 mg oral tablet: 1 tab(s) ( 20 mg ), PO, Daily, # 90 tab(s), 1 Refill(s), Type: Maintenance, Pharmacy: [facility], 1 tab(s) po daily  omeprazole 20 mg oral delayed release capsule: 1 cap(s) ( 20 mg ), PO, Daily, # 90 cap(s), 3 Refill(s), Type: Maintenance, Pharmacy[id number], 1 cap(s) po daily  Documented Medications  Documented  OsCal 500 oral tablet, chewable: 3 tab(s) ( 3,750 mg ), daily, Instructions: contains vitamin D3 600IU, 0 Refill(s), Type: Maintenance  ibuprofen: po, prn, PRN: as needed for pain, 0 Refill(s), Type: Maintenance   Problem list:    All Problems  Blood pressure elevated / SNOMED CT 096313061 / Confirmed  Esophageal reflux disease / SNOMED CT 242188289 / Confirmed  History of chickenpox / SNOMED CT 671195769 / Confirmed  Iron deficiency anemia / SNOMED CT 489024928 / Confirmed  Obesity / SNOMED CT 0546298015 / Probable  OA (osteoarthritis) / SNOMED CT 5588726414 / Confirmed  Osteoporosis / SNOMED CT 273234662 / Confirmed  Pure hypercholesterolemia / SNOMED CT 964502012 / Confirmed  Inactive: Thyroid nodule / SNOMED CT 037224110  Resolved: Acute lower UTI (urinary tract infection) / SNOMED CT 2687289432  Resolved: Bilateral carpal tunnel syndrome / SNOMED CT 89493791  Resolved: Head trauma / SNOMED CT 934272416  Resolved: Pregnancy / SNOMED CT 524462676  Resolved: Pregnancy / SNOMED CT 316915762  Resolved: Pregnancy / SNOMED CT 635165532  Resolved: Seizures / SNOMED CT 880414874  Canceled: Obesity / ICD-9-.00       Histories   Past Medical History:    Active  OA (osteoarthritis) (7283726590): Onset in  at 66 years.  Comments:  2012 CST 1:20 PM CST - Valerie  Gina  Right knee  Esophageal reflux disease (262874298)  Obesity (8434133147)  Blood pressure elevated (331113392)  Iron deficiency anemia (227278790)  History of chickenpox (491369704)  Resolved  Seizures (352200011): Onset in  at 20 years.  Resolved.  Comments:  2012 CST 10:13 AM CST - MaxConcha penaan  Of unknown origin.  Acute lower UTI (urinary tract infection) (5653771329):  Resolved.  Bilateral carpal tunnel syndrome (35118218):  Resolved.  Head trauma (239624146):  Resolved.  Comments:  2014 CST 3:38 PM CST - Davina Santana  Fell back on ice with head wound.  No sequelae.  Pregnancy (204807606):  Resolved in  at 26 years.  Pregnancy (865705516):  Resolved in  at 28 years.  Pregnancy (065025757):  Resolved in  at 30 years.   Family History:    Cancer  Father ()  Comments:  2012 10:08 AM - Davina Santana  Prostate cancer.    2010 1:33 PM - Angelique Singleton  Stomach, Prostate cancer  Asthma  Father ()  Breast cancer  Mother ()  Sister (Guadalupe)  Sister (Nereida)  Hypertension  Mother ()  High blood pressure  Sister (Guadalupe)  Sister (Nereida)  Congestive heart failure  Mother ()  Comments:  2012 10:18 AM - Davina Santana  Cunningham's lung.  Seasonal allergy  Daughter  Son (Faraz)     Procedure history:    Colonoscopy (941951223) in the month of 10/2014 at 73 Years.  Comments:  2016 12:58 PM - Juan Aad SHAKIR, Chelo  few diverticula, otherwise normal  Upper GI endoscopy (4509391164) in the month of 10/2014 at 73 Years.  Bone density scan (172618706) in the month of 2009 at 68 Years.  Cataract extraction (27761216) in  at 66 Years.  Comments:  2010 1:30 PM - Angelique Singleton. R. 2007  Colonoscopy (844601331) on 10/6/2005 at 64 Years.  Comments:  2012 10:03 AM - Dewayne  "Chelo SCHILLING  Normal - no polyps/no masses  Excision left cheek. on 2005 at 64 Years.  excision of thyroid nodule in the month of 2001 at 60 Years.  Vaginal hysterectomy (238513945) in the month of 1998 at 58 Years.  Cholecystectomy (62209742) in  at 42 Years.  Childbirth (6397198736).  Comments:  2010 1:31 PM - Mani Angelique   3, Para 3  Blepharoplasty of upper eyelid (61301871).  Comments:  2012 1:19 PM - Gina Padilla  Ptosis  Hysterectomy (684407045).  Comments:  2014 3:37 PM - Davina Santana  With one ovary remaining.  Release of trigger thumb (456897656).  Comments:  2017 1:04 PM - Dewayne SCHILLINGChelo  right   Social History:        Alcohol Assessment            Past      Tobacco Assessment            Never      Substance Abuse Assessment            Never      Employment and Education Assessment            Work/School description: teacher.            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      Home and Environment Assessment            Marital status: .  Spouse/Partner name: Himanshu.      Nutrition and Health Assessment            Type of diet: Regular.      Exercise and Physical Activity Assessment                     Comments:                      2017 - Chelo Gonzales                     Walking, biking, \"some leg and back weights\".  Five times per week.      Other Assessment            Marital status,         Physical Examination   vital signs stable, as noted above   Vital Signs   2018 1:19 PM CST Temperature Tympanic 97.9 DegF    Peripheral Pulse Rate 76 bpm    Systolic Blood Pressure 174 mmHg  HI    Diastolic Blood Pressure 85 mmHg  HI    Mean Arterial Pressure 115 mmHg    BP Site Right arm    BP Systolic Repeat 173 mmHg    BP Diastolic Repeat 97 mmHg      Measurements from flowsheet : Measurements   2018 1:19 PM CST    Weight Measured - Standard                163 lb     General:  Alert and oriented, No acute " distress.    Eye:  Extraocular movements are intact.    HENT:  Normocephalic, Oral mucosa is moist.    Neck:  Supple, No lymphadenopathy.    Respiratory:  Lungs are clear to auscultation, Respirations are non-labored.    Cardiovascular:  Normal rate, Regular rhythm, No murmur, No edema.    Gastrointestinal:  Soft, Non-tender.    Musculoskeletal:  Normal range of motion, Normal strength, No tenderness.    Neurologic:  Alert, Oriented, Normal motor function, No focal deficits.    Cognition and Speech:  Oriented, Speech clear and coherent.    Psychiatric:  Appropriate mood & affect.       Review / Management   Results review:  Lab results   2/6/2018 9:23 AM CST Cholesterol 166 mg/dL    Non-HDL 91    HDL 75 mg/dL    Chol/HDL Ratio 2.2    LDL 77    Triglyceride 68 mg/dL   .       Impression and Plan   Diagnosis     Iron deficiency anemia (HTY26-WU D50.0).     Esophageal Reflux Disease (LGJ40-KD K21.9).     Osteoporosis (BDR17-GY M81.0).       ..) GERD, esophagitis   - grade C; EGD in '14 ([facility])   - pathology showing no evidence of Barretts esophagus   - maintained on omeprazole 20mg daily   - recommending H2 blocker for any breakthrough symptoms    .) h/o severe iron deficiency anemia - resolved   - Hgb 6.2 ('14) - related to esophagitis, normal colonocopy in '14   - Hgb since normalized   - monitor CBC annually    .) hypertension, uncontrolled   - advised home monitoring with bp cuff and to maintain log    .) health maintenance   - osteoporosis based on '16 DEXA     - on calcium/vitamin D daily     - I'd like to begin on Reclast 5mg IV annually (historical intolerance to oral bisphosphonate therapy)   - mixed hyperlipidemia; high HDL and LDL    - previously recommended coronary calcium scoring - did not complete    RTC in 2 months for bp reassessment

## 2022-02-16 NOTE — NURSING NOTE
Vital Signs Entered On:  12/22/2021 1:09 PM CST    Performed On:  12/22/2021 1:09 PM CST by Chelo Buchanan CMA               Vital Signs   Systolic Blood Pressure :   204 mmHg (HI)    Diastolic Blood Pressure :   86 mmHg (HI)    Mean Arterial Pressure :   125 mmHg   BP Site :   Right arm   Chelo Buchanan CMA - 12/22/2021 1:09 PM CST

## 2022-02-16 NOTE — TELEPHONE ENCOUNTER
Entered by Dee Real CMA on September 10, 2021 12:00:10 PM CDT  order in your box to sign.       ---------------------  From: Mg Aguiar MD   To: [employer] Message Pool ([id number] [facility] [place]);     Sent: 9/10/2021 11:55:00 AM CDT  Subject: General Message     Can we get an order to [facility] infusion center for Reclast 5mg IV annually (they may have an active order).  She just had labs done with normal GFR  Re: osteoporosis---------------------  From: Dee Real CMA ([employer] Message Pool ([id number]_[facility]))   To: Mg Aguiar MD;     Sent: 9/10/2021 12:00:14 PM CDT  Subject: RE: General Message

## 2022-02-16 NOTE — PROGRESS NOTES
Patient:   SURJIT BOURNE            MRN: [id number]            FIN: [id number]               Age:   80 years     Sex:  Female     :  1940   Associated Diagnoses:   Iron deficiency anemia; Esophageal Reflux Disease; Osteoporosis; Hypertension   Author:   Mg Aguiar MD      Visit Information      Date of Service: 2021 01:45 pm  Performing Location: [facility]  Encounter#: [id number]      Primary Care Provider (PCP):  Mg Aguiar MD    NPI# [id number]      Referring Provider:  Mg Aguiar MD    NPI# [id number]      Chief Complaint   2021 1:51 PM CST    1.  f/u BP   2.  continued cough with certain foods              Additional Information:No additional information recorded during visit.   Chief complaint and symptoms as noted above and confirmed with patient.  Recent lab and diagnostic studies reviewed with patient      History of Present Illness     12/3/2020: Surjit presents for annual wellness evaluation.  She and Rohan are doing well.  They are not planning on traveling to [place] this year because of the pandemic.  She and her family have been very careful with isolation.  She has been wearing a mask in all indoor occasions.  She and Himanshu have been healthy.  Blood pressure remains consistently high.  She is hesitant about introduction of medications though after further conversation is willing to trial further medicine.  She did not receive Reclast this year though would be willing to do it now.  21: Jo Ann returns to clinic for blood pressure follow-up.  At her last visit was started on losartan 50 mg daily due to persistent uncontrolled hypertension.  She has tolerated the medications without any real issues.  She does monitor her home blood pressures.  States before taking the medication, and her home systolic blood pressures were generally in the 150s.  She brings in a log showing home blood pressures now primarily in the 120s and 130s.  Heart rates per home  recordings generally in the 60s.  Some concern with automated blood pressure cuff reporting a pulse rate of 35.  Complaints of persistent cough, questions of its food related.         Review of Systems   Constitutional:  No fever, No chills, No weakness, No fatigue.    Eye:  Negative except as documented in history of present illness.    Ear/Nose/Mouth/Throat:  Nasal congestion.    Respiratory:  Cough, No shortness of breath.    Cardiovascular:  No chest pain, No palpitations, No peripheral edema, No syncope.    Gastrointestinal:  No nausea, No vomiting, No abdominal pain.    Genitourinary:  No dysuria, No hematuria.    Hematology/Lymphatics:  Negative except as documented in history of present illness.    Endocrine   Immunologic   Musculoskeletal:  No joint pain, No muscle pain.    Integumentary   Neurologic:  Alert and oriented X4, No numbness, No tingling, No headache.       Health Status   Allergies:    Allergic Reactions (Selected)  Severity Not Documented  Acetaminophen (Nausea and vomiting)  Aciphex (Upset stomach)  Codeine (Vomit)  HYDROcodone (Nausea and vomiting)  TraMADol (Vomit)   Medications:  (Selected)   Prescriptions  Prescribed  atorvastatin 20 mg oral tablet: = 1 tab(s), Oral, daily, # 90 tab(s), 3 Refill(s), Type: Maintenance, Pharmacy: [id number] #[id number], Pt due for annual med check for further refills., 1 tab(s) Oral daily, 64.5, in, 12/03/20 14:07:00 CST, Height Measured, 161, lb, 12/03/20 14:...  losartan 50 mg oral tablet: = 1 tab(s) ( 50 mg ), Oral, daily, # 90 tab(s), 3 Refill(s), Type: Maintenance, Pharmacy: [id number] #[id number], 1 tab(s) Oral daily, 64.5, in, 12/03/20 14:07:00 CST, Height Measured, 161, lb, 12/03/20 14:07:00 CST, Weight Measured  omeprazole 20 mg oral delayed release capsule: = 1 cap(s) ( 20 mg ), Oral, daily, # 90 cap(s), 3 Refill(s), Type: Maintenance, Pharmacy: [id number] #[id number], keep on file and pt will notify when needed, 1 cap(s) Oral  daily, 64.5, in, 12/03/20 14:07:00 CST, Height Measured, 161, lb, 12/03/2...  Documented Medications  Documented  OsCal 500 oral tablet, chewable: 3 tab(s) ( 3,750 mg ), daily, Instructions: contains vitamin D3 600IU, 0 Refill(s), Type: Maintenance  ibuprofen: po, prn, PRN: as needed for pain, 0 Refill(s), Type: Maintenance,    Medications          *denotes recorded medication          atorvastatin 20 mg oral tablet: 1 tab(s), Oral, daily, 90 tab(s), 3 Refill(s).          *OsCal 500 oral tablet, chewable: 3,750 mg, 3 tab(s), daily, contains vitamin D3 600IU.          *ibuprofen: po, prn, PRN: as needed for pain, 0 Refill(s).          losartan 50 mg oral tablet: 50 mg, 1 tab(s), Oral, daily, 90 tab(s), 3 Refill(s).          omeprazole 20 mg oral delayed release capsule: 20 mg, 1 cap(s), Oral, daily, 90 cap(s), 3 Refill(s).       Problem list:    All Problems  Blood pressure elevated / SNOMED CT 470465764 / Confirmed  Esophageal reflux disease / SNOMED CT 812103450 / Confirmed  History of chickenpox / SNOMED CT 363520057 / Confirmed  Iron deficiency anemia / SNOMED CT 941709118 / Confirmed  Obesity / SNOMED CT 2395257876 / Probable  OA (osteoarthritis) / SNOMED CT 9392456203 / Confirmed  Osteoporosis / SNOMED CT 888644470 / Confirmed  Pure hypercholesterolemia / SNOMED CT 486030512 / Confirmed  Inactive: Thyroid nodule / SNOMED CT 605135203  Resolved: Acute lower UTI (urinary tract infection) / SNOMED CT 4376176947  Resolved: Bilateral carpal tunnel syndrome / SNOMED CT 88637308  Resolved: Head trauma / SNOMED CT 204131889  Resolved: Pregnancy / SNOMED CT 131050486  Resolved: Pregnancy / SNOMED CT 115482220  Resolved: Pregnancy / SNOMED CT 418035389  Resolved: Seizures / SNOMED CT 595987786  Canceled: Obesity / ICD-9-.00      Histories   Past Medical History:    Active  OA (osteoarthritis) (7246825005): Onset in 2006 at 66 years.  Comments:  12/19/2012 CST 1:20 PM CST - Gina Padilla  Right knee  Esophageal  reflux disease (921616082)  Obesity (1742157033)  Blood pressure elevated (056599607)  Iron deficiency anemia (680264003)  History of chickenpox (515951028)  Resolved  Seizures (623439627): Onset in  at 20 years.  Resolved.  Comments:  2012 CST 10:13 AM DENICE BernsteinDavina pena  Of unknown origin.  Acute lower UTI (urinary tract infection) (6498834412):  Resolved.  Bilateral carpal tunnel syndrome (72120405):  Resolved.  Head trauma (129682903):  Resolved.  Comments:  2014 CST 3:38 PM DENICE Santana Davina  Fell back on ice with head wound.  No sequelae.  Pregnancy (450477815):  Resolved in  at 26 years.  Pregnancy (419424299):  Resolved in  at 28 years.  Pregnancy (662183752):  Resolved in  at 30 years.   Family History:    Cancer  Father ()  Comments:  2012 10:08 AM Davina Mccann  Prostate cancer.    2010 1:33 PM Angelique Montemayor  Stomach, Prostate cancer  Asthma  Father ()  Breast cancer  Mother ()  Sister (Guadalupe)  Sister (Nereida)  Hypertension  Mother ()  High blood pressure  Sister (Guadalupe)  Sister (Nereida)  Arthritis  Father ()  Mother ()  Congestive heart failure  Mother ()  Comments:  2012 10:18 AM Davina Mccann  Cunningham's lung.  Seasonal allergy  Daughter  Son (Faraz)     Procedure history:    Release of trigger finger (474947536) in the month of 2020 at 79 Years.  Comments:  12/3/2020 10:43 AM Chelo Power CMA  left finger  Colonoscopy (717496476) in the month of 10/2014 at 73 Years.  Comments:  2016 12:58 PM CDT - Chelo Buchanan CMA  few diverticula, otherwise normal  Upper GI endoscopy (2771007777) in the month of 10/2014 at 73 Years.  Bone density scan (220938540) in the month of 2009 at 68 Years.  Cataract extraction (72339641) in  at 66 Years.  Comments:  2010 1:30 PM Angelique MontemayorPolo VAZQUEZ 2007  Colonoscopy (482241968) on 10/6/2005 at 64  "Years.  Comments:  2012 10:03 AM CST - GuanakoChelo funez CMA  Normal - no polyps/no masses  Excision left cheek. on 2005 at 64 Years.  excision of thyroid nodule in the month of 2001 at 60 Years.  Vaginal hysterectomy (326608233) in the month of 1998 at 58 Years.  Cholecystectomy (11148296) in  at 42 Years.  Childbirth (0720062686).  Comments:  2010 1:31 PM CST - Mani Angelique   3, Para 3  Blepharoplasty of upper eyelid (69789583).  Comments:  2012 1:19 PM CST - Gina Padilla  Ptosis  Hysterectomy (920718324).  Comments:  2014 3:37 PM CST - Davina Santana  With one ovary remaining.  Release of trigger thumb (481466564).  Comments:  2017 1:04 PM CDT - Dewayne Chelo SCHILLING  right   Social History:        Electronic Cigarette/Vaping Assessment            Electronic Cigarette Use: Never.      Alcohol Assessment            Past      Tobacco Assessment            Never (less than 100 in lifetime)      Substance Abuse Assessment            Never      Employment and Education Assessment            Work/School description: teacher.            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      Home and Environment Assessment            Marital status: .  Spouse/Partner name: Himanshu.      Nutrition and Health Assessment            Type of diet: Regular.      Exercise and Physical Activity Assessment                     Comments:                      2017 - Chelo Gonzales                     Walking, biking, \"some leg and back weights\".  Five times per week.      Other Assessment            Marital status,         Physical Examination   vital signs stable, as noted above   Vital Signs   2021 1:51 PM CST Temperature Tympanic 96.7 DegF  LOW    Peripheral Pulse Rate 64 bpm    Systolic Blood Pressure 172 mmHg  HI    Diastolic Blood Pressure 68 mmHg    Mean Arterial Pressure 103 mmHg    Oxygen Saturation 96 %      Measurements from flowsheet : " Measurements   1/14/2021 1:51 PM CST Height Measured - Standard 64.5 in    Height/Length Estimated 64.5 in    Weight Measured - Standard 163 lb    BSA 1.83 m2    Body Mass Index 27.54 kg/m2  HI      General:  Alert and oriented, No acute distress.    Respiratory:  Lungs are clear to auscultation, Respirations are non-labored, Breath sounds are equal.    Cardiovascular:  Normal rate, No murmur, regularly irregular rhythm.    Gastrointestinal:  Soft.    Neurologic:  Alert, Oriented.    Cognition and Speech:  Oriented, Speech clear and coherent.    Psychiatric:  Appropriate mood & affect.       Review / Management   Results review:  Lab results   11/25/2020 9:48 AM CST Sodium Level 136 mmol/L    Potassium Level 4.3 mmol/L    Chloride Level 102 mmol/L    CO2 Level 27 mmol/L    Glucose Level 97 mg/dL    BUN 17 mg/dL    Creatinine 0.94 mg/dL  HI    BUN/Creat Ratio 18    eGFR 57 mL/min/1.73m2  LOW    eGFR African American 66 mL/min/1.73m2    Calcium Level 9.6 mg/dL    Cholesterol 177 mg/dL    Non-    HDL 67 mg/dL    Chol/HDL Ratio 2.6    LDL 93    Triglyceride 77 mg/dL    WBC 7.3    RBC 4.14    Hgb 13.4 gm/dL    Hct 38.3 %    MCV 92.5 fL    MCH 32.4 pg    MCHC 35.0 gm/dL    RDW 12.1 %    Platelet 287    MPV 10.7 fL   .       Impression and Plan   Diagnosis     Iron deficiency anemia (DZI84-PN D50.0).     Esophageal Reflux Disease (BEN53-PC K21.9).     Osteoporosis (VNH86-LJ M81.0).     Hypertension (AOQ07-AE I10).         .) hypertension, uncontrolled (whitecoat component; better home readings)   current antihypertensive regimen: losartan 50mg qhs  regimen changes: none  intolerance: amlodipine (palpitations, bradycardia)  future titration/work-up plan:    - BP goal <140/90   - ECG today in clinic with PVCs (abnormal pulse); otherwise unchanged 1st degree AVB pattern (doubt any relationship to losartan)    plan as previously outlined and not discussed at today's visit     .) GERD, h/o esophagitis   - grade C; EGD in  '14 ([facility])   - pathology showing no evidence of Barretts esophagus   - maintained on omeprazole 20mg daily   - recommending H2 blocker for any breakthrough symptoms    .) h/o severe iron deficiency anemia - resolved   - Hgb 6.2 ('14) - related to esophagitis, normal colonoscopy in '14   - Hgb since normalized   - monitor CBC annually    .) health maintenance   - osteoporosis based on '16 DEXA     - on calcium/vitamin D daily     - last DEXA in '19     - on Reclast; advised to schedule now   - no further breast or CRC screening advised   - mixed hyperlipidemia; high HDL and LDL    - previously recommended coronary calcium scoring - did not complete    - on atorvastatin - subsequent normalization of LDL    RTC in 6 months

## 2022-02-16 NOTE — LETTER
(Inserted Image. Unable to display)   November 27, 2020      SURJIT BOURNE  [address]   [place], [place] [id number]        Dear SURJIT,     Thank you for selecting [facility] (previously [facility]) for your healthcare needs. Below you will find the results of your recent test(s) done at our clinic.       Labs for your review.  We can discuss in more detail at future clinic visit.       Result Name Current Result Previous Result Reference Range   Sodium Level (mmol/L)  136 11/25/2020  137 11/7/2019 135 - 146   Potassium Level (mmol/L)  4.3 11/25/2020  4.2 11/7/2019 3.5 - 5.3   Chloride Level (mmol/L)  102 11/25/2020  103 11/7/2019 98 - 110   CO2 Level (mmol/L)  27 11/25/2020  27 11/7/2019 20 - 32   Glucose Level (mg/dL)  97 11/25/2020  90 11/7/2019 65 - 99   BUN (mg/dL)  17 11/25/2020  14 11/7/2019 7 - 25   Creatinine Level (mg/dL) ((H)) 0.94 11/25/2020 ((H)) 0.99 11/7/2019 0.60 - 0.88   BUN/Creat Ratio  18 11/25/2020  14 11/7/2019 6 - 22   eGFR (mL/min/1.73m2) ((L)) 57 11/25/2020 ((L)) 54 11/7/2019 > OR = 60 -    eGFR  (mL/min/1.73m2)  66 11/25/2020  62 11/7/2019 > OR = 60 -    Calcium Level (mg/dL)  9.6 11/25/2020  9.4 11/7/2019 8.6 - 10.4   Cholesterol (mg/dL)  177 11/25/2020  154 11/7/2019  - <200   Non-HDL Cholesterol  110 11/25/2020  87 11/7/2019  - <130   HDL (mg/dL)  67 11/25/2020  67 11/7/2019 > OR = 50 -    Cholesterol/HDL Ratio  2.6 11/25/2020  2.3 11/7/2019  - <5.0   LDL  93 11/25/2020  74 11/7/2019    Triglyceride (mg/dL)  77 11/25/2020  45 11/7/2019  - <150   WBC  7.3 11/25/2020  6.3 11/7/2019 3.8 - 10.8   RBC  4.14 11/25/2020  3.84 11/7/2019 3.80 - 5.10   Hgb (gm/dL)  13.4 11/25/2020  11.9 11/7/2019 11.7 - 15.5   Hct (%)  38.3 11/25/2020  35.6 11/7/2019 35.0 - 45.0   MCV (fL)  92.5 11/25/2020  92.7 11/7/2019 80.0 - 100.0   MCH (pg)  32.4 11/25/2020  31.0 11/7/2019 27.0 - 33.0   MCHC (gm/dL)  35.0 11/25/2020  33.4 11/7/2019 32.0 - 36.0   RDW (%)  12.1  11/25/2020  12.0 11/7/2019 11.0 - 15.0   Platelet  287 11/25/2020  307 11/7/2019 140 - 400   MPV (fL)  10.7 11/25/2020  10.0 11/7/2019 7.5 - 12.5       Please contact me or my assistant at [phone] if you have any questions or concerns.     Sincerely,        Mg Aguiar MD    What do your labs mean?  Below is a glossary of commonly ordered labs:  LDL - Bad Cholesterol  HDL - Good Cholesterol  AST/ALT - Liver Function  Cr/Creatinine - Kidney Function  Microalbumin - Kidney Function  BUN - Kidney Function  PSA - Prostate   TSH - Thyroid Hormone  HgbA1c - Diabetes Test  Hgb (Hemoglobin) - Red Blood Cells

## 2022-02-16 NOTE — NURSING NOTE
Quick Intake Entered On:  12/17/2021 4:39 PM CST    Performed On:  12/17/2021 4:33 PM CST by Timbo Steinberg CMA               Summary   Advance Directive :   Yes   Height Measured :   64 in(Converted to: 5 ft 4 in, 162.56 cm)    Height/Length Estimated :   64.5 in(Converted to: 5 ft 4 in, 163.83 cm)    Timbo Steinberg CMA - 12/17/2021 4:33 PM CST   More Vitals   Systolic Blood Pressure Supine :   216 mmHg   Diastolic Blood Pressure Supine :   90 mmHg   Systolic Blood Pressure Sitting :   238 mmHg   Diastolic Blood Pressure Sitting :   113 mmHg   Systolic Blood Pressure Standing :   226 mmHg   Diastolic Blood Pressure Standing :   119 mmHg   Pulse Supine :   66 bpm   Pulse Sitting :   64 bpm (HI)    Pulse Standing :   73 bpm   Timbo Steinberg CMA - 12/17/2021 4:33 PM CST

## 2022-02-16 NOTE — PROGRESS NOTES
Patient:   SURJIT BOURNE            MRN: [id number]            FIN: [id number]               Age:   79 years     Sex:  Female     :  1940   Associated Diagnoses:   Iron deficiency anemia; Esophageal Reflux Disease; Osteoporosis; Hypertension   Author:   Mg Aguiar MD      Visit Information      Date of Service: 2019 09:52 am  Performing Location: [facility]  Encounter#: [id number]      Primary Care Provider (PCP):  Mg Aguiar MD    NPI# [id number]      Referring Provider:  Mg Aguiar MD    NPI# [id number]      Chief Complaint   2019 10:18 AM CST  Rx refill- Omeprazole, atorvastatin            Additional Information:No additional information recorded during visit.   Chief complaint and symptoms as noted above and confirmed with patient.  Recent lab and diagnostic studies reviewed with patient      History of Present Illness     2019: Jo Ann presents for general follow-up.  Overall doing well.  She has historically had an intolerance to most oral medications and has not done well on antihypertensives previously.  Has done well on Lipitor.  Reviewed her labs which demonstrated persistent improvement in her cholesterol profile compared to his historical trends.  Feels well otherwise.  Questions are need to continue with her medication as well as doing annual Reclast infusions related to history of osteoporosis.  She does describe some occasional falling though no history of fracture.           Review of Systems   Constitutional:  No fever, No chills, No weakness, No fatigue.    Eye:  Negative except as documented in history of present illness.    Ear/Nose/Mouth/Throat:  Negative except as documented in history of present illness.    Respiratory:  No shortness of breath, No cough.    Cardiovascular:  No chest pain, No palpitations, No peripheral edema, No syncope.    Gastrointestinal:  No nausea, No vomiting, No abdominal pain.    Genitourinary:  No dysuria, No hematuria.     Hematology/Lymphatics:  Negative except as documented in history of present illness.    Endocrine:  No excessive thirst, No polyuria.    Immunologic:  No recurrent fevers.    Musculoskeletal:  No joint pain, No muscle pain.    Integumentary   Neurologic:  Alert and oriented X4, No numbness, No tingling, No headache.       Health Status   Allergies:    Allergic Reactions (Selected)  Severity Not Documented  Acetaminophen (Nausea and vomiting)  Aciphex (Upset stomach)  Codeine (Vomit)  HYDROcodone (Nausea and vomiting)  TraMADol (Vomit)   Medications:  (Selected)   Prescriptions  Prescribed  atorvastatin 20 mg oral tablet: = 1 tab(s), Oral, daily, # 90 tab(s), 3 Refill(s), Type: Maintenance, Pharmacy: [facility], Due for appointment, 1 tab(s) Oral daily  omeprazole 20 mg oral delayed release capsule: = 1 cap(s) ( 20 mg ), Oral, daily, # 90 cap(s), 3 Refill(s), Type: Maintenance, Pharmacy: [facility], keep on file and pt will notify when needed, 1 cap(s) Oral daily  Documented Medications  Documented  OsCal 500 oral tablet, chewable: 3 tab(s) ( 3,750 mg ), daily, Instructions: contains vitamin D3 600IU, 0 Refill(s), Type: Maintenance  ibuprofen: po, prn, PRN: as needed for pain, 0 Refill(s), Type: Maintenance,    Medications          *denotes recorded medication          atorvastatin 20 mg oral tablet: 1 tab(s), Oral, daily, 90 tab(s), 3 Refill(s).          *OsCal 500 oral tablet, chewable: 3,750 mg, 3 tab(s), daily, contains vitamin D3 600IU.          *ibuprofen: po, prn, PRN: as needed for pain, 0 Refill(s).          omeprazole 20 mg oral delayed release capsule: 20 mg, 1 cap(s), Oral, daily, 90 cap(s), 3 Refill(s).       Problem list:    All Problems  Blood pressure elevated / SNOMED CT 171521002 / Confirmed  Esophageal reflux disease / SNOMED CT 695017699 / Confirmed  History of chickenpox / SNOMED CT 280565496 / Confirmed  Iron deficiency anemia / SNOMED CT 017732040 / Confirmed  Obesity /  SNOMED CT 7516589377 / Probable  OA (osteoarthritis) / SNOMED CT 1046515961 / Confirmed  Osteoporosis / SNOMED CT 577143098 / Confirmed  Pure hypercholesterolemia / SNOMED CT 762672908 / Confirmed  Inactive: Thyroid nodule / SNOMED CT 904109229  Resolved: Acute lower UTI (urinary tract infection) / SNOMED CT 2174787823  Resolved: Bilateral carpal tunnel syndrome / SNOMED CT 09992132  Resolved: Head trauma / SNOMED CT 923859188  Resolved: Pregnancy / SNOMED CT 585468458  Resolved: Pregnancy / SNOMED CT 709852532  Resolved: Pregnancy / SNOMED CT 514326359  Resolved: Seizures / SNOMED CT 456048424  Canceled: Obesity / ICD-9-.00      Histories   Past Medical History:    Active  OA (osteoarthritis) (3168478001): Onset in  at 66 years.  Comments:  2012 CST 1:20 PM CST Gina Burnett  Right knee  Esophageal reflux disease (353055343)  Obesity (8596072253)  Blood pressure elevated (393011721)  Iron deficiency anemia (931535086)  History of chickenpox (338443300)  Resolved  Seizures (684682472): Onset in  at 20 years.  Resolved.  Comments:  2012 CST 10:13 AM Davina Mccann  Of unknown origin.  Acute lower UTI (urinary tract infection) (3376277749):  Resolved.  Bilateral carpal tunnel syndrome (84346027):  Resolved.  Head trauma (802413270):  Resolved.  Comments:  2014 CST 3:38 PM Davina Mccann  Fell back on ice with head wound.  No sequelae.  Pregnancy (957682803):  Resolved in  at 26 years.  Pregnancy (094972045):  Resolved in  at 28 years.  Pregnancy (594001870):  Resolved in  at 30 years.   Family History:    Cancer  Father ()  Comments:  2012 10:08 AM Davina Mccann  Prostate cancer.    2010 1:33 PM Angelique Montemayor  Stomach, Prostate cancer  Asthma  Father ()  Breast cancer  Mother ()  Sister (Guadalupe)  Sister (Nereida)  Hypertension  Mother ()  High blood pressure  Sister (Guadalupe)  Sister (Nereida)  Congestive heart  failure  Mother ()  Comments:  2012 10:18 AM Davina Mccann  Cunningham's lung.  Seasonal allergy  Daughter  Son (Faraz)     Procedure history:    Colonoscopy (874673900) in the month of 10/2014 at 73 Years.  Comments:  2016 12:58 PM CDT - Chelo Buchanan CMA  few diverticula, otherwise normal  Upper GI endoscopy (8105233554) in the month of 10/2014 at 73 Years.  Bone density scan (878520856) in the month of 2009 at 68 Years.  Cataract extraction (09297564) in  at 66 Years.  Comments:  2010 1:30 PM Angelique Montemayor 2006. R. 2007  Colonoscopy (970151853) on 10/6/2005 at 64 Years.  Comments:  2012 10:03 AM DENICE Buchanan Chelo SCHILLING  Normal - no polyps/no masses  Excision left cheek. on 2005 at 64 Years.  excision of thyroid nodule in the month of 2001 at 60 Years.  Vaginal hysterectomy (365484616) in the month of 1998 at 58 Years.  Cholecystectomy (34556977) in  at 42 Years.  Childbirth (9340923724).  Comments:  2010 1:31 PM Angelique Montemayor   3, Para 3  Blepharoplasty of upper eyelid (99372563).  Comments:  2012 1:19 PM Gina Harmon  Ptosis  Hysterectomy (853353449).  Comments:  2014 3:37 PM Davina Mccann  With one ovary remaining.  Release of trigger thumb (395523920).  Comments:  2017 1:04 PM CDT - Dewayne Chelo SCHILLING  right   Social History:        Alcohol Assessment            Past      Tobacco Assessment            Never      Substance Abuse Assessment            Never      Employment and Education Assessment            Work/School description: teacher.            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      Home and Environment Assessment            Marital status: .  Spouse/Partner name: Himanshu.      Nutrition and Health Assessment            Type of diet: Regular.      Exercise and Physical Activity Assessment                     Comments:                      2017 -  "Gonzales , Chelo                     Walking, biking, \"some leg and back weights\".  Five times per week.      Other Assessment            Marital status,         Physical Examination   vital signs stable, as noted above   Vital Signs   11/19/2019 10:18 AM CST Temperature Tympanic 97.2 DegF  LOW    Peripheral Pulse Rate 76 bpm    HR Method Electronic    Systolic Blood Pressure 148 mmHg  HI    Diastolic Blood Pressure 72 mmHg    Mean Arterial Pressure 97 mmHg    BP Site Right arm    BP Method Manual    Oxygen Saturation 99 %      Measurements from flowsheet : Measurements   11/19/2019 10:18 AM CST  Weight Measured - Standard                160.4 lb     General:  Alert and oriented, No acute distress.    HENT:  Tympanic membranes are clear, No sinus tenderness.    Neck:  No lymphadenopathy, No thyromegaly.    Respiratory:  Lungs are clear to auscultation, Respirations are non-labored, Breath sounds are equal.    Cardiovascular:  Normal rate, Regular rhythm, No murmur.    Gastrointestinal:  Soft, Non-tender, Non-distended.    Neurologic:  Alert, Oriented.    Cognition and Speech:  Oriented, Speech clear and coherent.    Psychiatric:  Appropriate mood & affect.       Review / Management   Results review:  Lab results   11/7/2019 9:28 AM CST Sodium Level 137 mmol/L    Potassium Level 4.2 mmol/L    Chloride Level 103 mmol/L    CO2 Level 27 mmol/L    Glucose Level 90 mg/dL    BUN 14 mg/dL    Creatinine 0.99 mg/dL  HI    BUN/Creat Ratio 14    eGFR 54 mL/min/1.73m2  LOW    eGFR African American 62 mL/min/1.73m2    Calcium Level 9.4 mg/dL    Cholesterol 154 mg/dL    Non-HDL 87    HDL 67 mg/dL    Chol/HDL Ratio 2.3    LDL 74    Triglyceride 45 mg/dL    WBC 6.3    RBC 3.84    Hgb 11.9 gm/dL    Hct 35.6 %    MCV 92.7 fL    MCH 31.0 pg    MCHC 33.4 gm/dL    RDW 12.0 %    Platelet 307    MPV 10.0 fL   .       Impression and Plan   Diagnosis     Iron deficiency anemia (ZIB72-ZA D50.0).     Esophageal Reflux Disease (ZQT88-OB " K21.9).     Osteoporosis (USB38-ID M81.0).     Hypertension (ZNG70-EY I10).         .) hypertension, controlled (whitecoat component) - not wanting to pursue further therapies for now  current antihypertensive regimen: none  regimen changes: none  intolerance: amlodipine (palpitations, bradycardia)  future titration/work-up plan:    - BP goal <140/90    .) GERD, h/o esophagitis   - grade C; EGD in '14 ([facility])   - pathology showing no evidence of Barretts esophagus   - maintained on omeprazole 20mg daily   - recommending H2 blocker for any breakthrough symptoms    .) h/o severe iron deficiency anemia - resolved   - Hgb 6.2 ('14) - related to esophagitis, normal colonoscopy in '14   - Hgb since normalized   - monitor CBC annually    .) health maintenance   - osteoporosis based on '16 DEXA     - on calcium/vitamin D daily     - repeat DEXA in near future     - on Reclast; next dose due in 2/2020   - mixed hyperlipidemia; high HDL and LDL    - previously recommended coronary calcium scoring - did not complete    - on atorvastatin - subsequent normalization of LDL    RTC annually

## 2022-02-16 NOTE — PROGRESS NOTES
Patient:   SURJIT BOURNE            MRN: [id number]            FIN: [id number]               Age:   80 years     Sex:  Female     :  1940   Associated Diagnoses:   Iron deficiency anemia; Esophageal Reflux Disease; Osteoporosis; Hypertension   Author:   Mg Aguiar MD      Visit Information      Date of Service: 2020 01:50 pm  Performing Location: [facility]  Encounter#: [id number]      Primary Care Provider (PCP):  Mg Aguiar MD    NPI# [id number]      Referring Provider:  Mg Aguiar MD    NPI# [id number]      Chief Complaint   12/3/2020 1:59 PM CST    AWV            Additional Information:No additional information recorded during visit.   Chief complaint and symptoms as noted above and confirmed with patient.  Recent lab and diagnostic studies reviewed with patient      History of Present Illness     12/3/2020: Surjit presents for annual wellness evaluation.  She and Rohan are doing well.  They are not planning on traveling to [place] this year because of the pandemic.  She and her family have been very careful with isolation.  She has been wearing a mask in all indoor occasions.  She and Himanshu have been healthy.  Blood pressure remains consistently high.  She is hesitant about introduction of medications though after further conversation is willing to trial further medicine.  She did not receive Reclast this year though would be willing to do it now.         Review of Systems   Constitutional:  No fever, No chills, No weakness, No fatigue.    Eye:  Negative except as documented in history of present illness, dry eyes.    Ear/Nose/Mouth/Throat:  Nasal congestion.    Respiratory:  No shortness of breath, No cough.    Cardiovascular:  No chest pain, No palpitations, No peripheral edema, No syncope.    Gastrointestinal:  No nausea, No vomiting, No abdominal pain.    Genitourinary:  No dysuria, No hematuria.    Hematology/Lymphatics:  Negative except as documented in history of  present illness.    Endocrine:  No excessive thirst, No polyuria.    Immunologic:  No recurrent fevers.    Musculoskeletal:  No joint pain, No muscle pain.    Integumentary   Neurologic:  Alert and oriented X4, No numbness, No tingling, No headache.       Health Status   Allergies:    Allergic Reactions (Selected)  Severity Not Documented  Acetaminophen (Nausea and vomiting)  Aciphex (Upset stomach)  Codeine (Vomit)  HYDROcodone (Nausea and vomiting)  TraMADol (Vomit)   Medications:  (Selected)   Prescriptions  Prescribed  atorvastatin 20 mg oral tablet: = 1 tab(s), Oral, daily, # 90 tab(s), 3 Refill(s), Type: Maintenance, Pharmacy: [facility] #[id number], Pt due for annual med check for further refills., 1 tab(s) Oral daily, 64.5, in, 12/03/20 14:07:00 CST, Height Measured, 161, lb, 12/03/20 14:...  losartan 50 mg oral tablet: = 1 tab(s) ( 50 mg ), Oral, daily, # 90 tab(s), 3 Refill(s), Type: Maintenance, Pharmacy: [facility] #[id number], 1 tab(s) Oral daily, 64.5, in, 12/03/20 14:07:00 CST, Height Measured, 161, lb, 12/03/20 14:07:00 CST, Weight Measured  omeprazole 20 mg oral delayed release capsule: = 1 cap(s) ( 20 mg ), Oral, daily, # 90 cap(s), 3 Refill(s), Type: Maintenance, Pharmacy: [facility] #[id number], keep on file and pt will notify when needed, 1 cap(s) Oral daily, 64.5, in, 12/03/20 14:07:00 CST, Height Measured, 161, lb, 12/03/2...  Documented Medications  Documented  OsCal 500 oral tablet, chewable: 3 tab(s) ( 3,750 mg ), daily, Instructions: contains vitamin D3 600IU, 0 Refill(s), Type: Maintenance  ibuprofen: po, prn, PRN: as needed for pain, 0 Refill(s), Type: Maintenance,    Medications          *denotes recorded medication          atorvastatin 20 mg oral tablet: 1 tab(s), Oral, daily, 90 tab(s), 3 Refill(s).          *OsCal 500 oral tablet, chewable: 3,750 mg, 3 tab(s), daily, contains vitamin D3 600IU.          *ibuprofen: po, prn, PRN: as needed for pain, 0 Refill(s).           losartan 50 mg oral tablet: 50 mg, 1 tab(s), Oral, daily, 90 tab(s), 3 Refill(s).          omeprazole 20 mg oral delayed release capsule: 20 mg, 1 cap(s), Oral, daily, 90 cap(s), 3 Refill(s).       Problem list:    All Problems  Blood pressure elevated / SNOMED CT 628547766 / Confirmed  Esophageal reflux disease / SNOMED CT 430054139 / Confirmed  History of chickenpox / SNOMED CT 144586744 / Confirmed  Iron deficiency anemia / SNOMED CT 969827825 / Confirmed  Obesity / SNOMED CT 3719525652 / Probable  OA (osteoarthritis) / SNOMED CT 5598039598 / Confirmed  Osteoporosis / SNOMED CT 515096217 / Confirmed  Pure hypercholesterolemia / SNOMED CT 729205945 / Confirmed  Inactive: Thyroid nodule / SNOMED CT 429824415  Resolved: Acute lower UTI (urinary tract infection) / SNOMED CT 9262165299  Resolved: Bilateral carpal tunnel syndrome / SNOMED CT 72361628  Resolved: Head trauma / SNOMED CT 350303047  Resolved: Pregnancy / SNOMED CT 324327503  Resolved: Pregnancy / SNOMED CT 765819802  Resolved: Pregnancy / SNOMED CT 243767677  Resolved: Seizures / SNOMED CT 435475579  Canceled: Obesity / ICD-9-.00      Histories   Past Medical History:    Active  OA (osteoarthritis) (4581373998): Onset in 2006 at 66 years.  Comments:  12/19/2012 CST 1:20 PM CST Gina Burnett  Right knee  Esophageal reflux disease (937009937)  Obesity (0159004056)  Blood pressure elevated (475757460)  Iron deficiency anemia (412992014)  History of chickenpox (342534699)  Resolved  Seizures (575320152): Onset in 1960 at 20 years.  Resolved.  Comments:  12/20/2012 CST 10:13 AM Davina Mccann  Of unknown origin.  Acute lower UTI (urinary tract infection) (5036019081):  Resolved.  Bilateral carpal tunnel syndrome (64611156):  Resolved.  Head trauma (099239856):  Resolved.  Comments:  11/18/2014 CST 3:38 PM Davina Mccann  Fell back on ice with head wound.  No sequelae.  Pregnancy (582689740):  Resolved in 1967 at 26 years.  Pregnancy  (220085760):  Resolved in  at 28 years.  Pregnancy (399511204):  Resolved in  at 30 years.   Family History:    Cancer  Father ()  Comments:  2012 10:08 AM Davina Mccann  Prostate cancer.    2010 1:33 PM Angelique Montemayor  Stomach, Prostate cancer  Asthma  Father ()  Breast cancer  Mother ()  Sister (Guadalupe)  Sister (Nereida)  Hypertension  Mother ()  High blood pressure  Sister (Guadalupe)  Sister (Nereida)  Congestive heart failure  Mother ()  Comments:  2012 10:18 AM Davina Mccann  Cunningham's lung.  Seasonal allergy  Daughter  Son (Faraz)     Procedure history:    Release of trigger finger (407709541) in the month of 2020 at 79 Years.  Comments:  12/3/2020 10:43 AM Chelo Power CMA  left finger  Colonoscopy (216724220) in the month of 10/2014 at 73 Years.  Comments:  2016 12:58 PM CDT - Chelo Buchanan CMA  few diverticula, otherwise normal  Upper GI endoscopy (1210780440) in the month of 10/2014 at 73 Years.  Bone density scan (950368139) in the month of 2009 at 68 Years.  Cataract extraction (65631081) in  at 66 Years.  Comments:  2010 1:30 PM Angelique Montemayor 2006. R. 2007  Colonoscopy (823206976) on 10/6/2005 at 64 Years.  Comments:  2012 10:03 AM Chelo Power CMA  Normal - no polyps/no masses  Excision left cheek. on 2005 at 64 Years.  excision of thyroid nodule in the month of 2001 at 60 Years.  Vaginal hysterectomy (344024933) in the month of 1998 at 58 Years.  Cholecystectomy (37513091) in  at 42 Years.  Childbirth (3915741535).  Comments:  2010 1:31 PM Angelique Montemayor   3, Para 3  Blepharoplasty of upper eyelid (14007815).  Comments:  2012 1:19 PM Gina Harmon  Ptosis  Hysterectomy (536551691).  Comments:  2014 3:37 PM CST - Davina Santana  With one ovary remaining.  Release of trigger thumb (640694856).  Comments:  2017 1:04 PM CDT -  "Dewayne SCHILLINGChelo  right   Social History:        Electronic Cigarette/Vaping Assessment            Electronic Cigarette Use: Never.      Alcohol Assessment            Past      Tobacco Assessment            Never (less than 100 in lifetime)      Substance Abuse Assessment            Never      Employment and Education Assessment            Work/School description: teacher.            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      Home and Environment Assessment            Marital status: .  Spouse/Partner name: Himanshu.      Nutrition and Health Assessment            Type of diet: Regular.      Exercise and Physical Activity Assessment                     Comments:                      09/14/2017 - Chelo Gonzales                     Walking, biking, \"some leg and back weights\".  Five times per week.      Other Assessment            Marital status,         Physical Examination   vital signs stable, as noted above   Vital Signs   12/3/2020 1:59 PM CST Temperature Tympanic 96.5 DegF  LOW    Peripheral Pulse Rate 64 bpm    Systolic Blood Pressure 170 mmHg  HI    Diastolic Blood Pressure 72 mmHg    Mean Arterial Pressure 105 mmHg    Oxygen Saturation 95 %      Measurements from flowsheet : Measurements   12/3/2020 1:59 PM CST Height Measured - Standard 64.5 in    Height/Length Estimated 64.5 in    Weight Measured - Standard 161 lb    BSA 1.82 m2    Body Mass Index 27.21 kg/m2  HI      General:  Alert and oriented, No acute distress.    HENT:  Tympanic membranes are clear, No sinus tenderness.    Neck:  No lymphadenopathy, No thyromegaly.    Respiratory:  Lungs are clear to auscultation, Respirations are non-labored, Breath sounds are equal.    Cardiovascular:  Normal rate, Regular rhythm, No murmur.    Gastrointestinal:  Soft, Non-tender, Non-distended.    Neurologic:  Alert, Oriented.    Cognition and Speech:  Oriented, Speech clear and coherent.    Psychiatric:  Appropriate mood & " affect.       Review / Management   Results review:  Lab results   11/25/2020 9:48 AM CST Sodium Level 136 mmol/L    Potassium Level 4.3 mmol/L    Chloride Level 102 mmol/L    CO2 Level 27 mmol/L    Glucose Level 97 mg/dL    BUN 17 mg/dL    Creatinine 0.94 mg/dL  HI    BUN/Creat Ratio 18    eGFR 57 mL/min/1.73m2  LOW    eGFR African American 66 mL/min/1.73m2    Calcium Level 9.6 mg/dL    Cholesterol 177 mg/dL    Non-    HDL 67 mg/dL    Chol/HDL Ratio 2.6    LDL 93    Triglyceride 77 mg/dL    WBC 7.3    RBC 4.14    Hgb 13.4 gm/dL    Hct 38.3 %    MCV 92.5 fL    MCH 32.4 pg    MCHC 35.0 gm/dL    RDW 12.1 %    Platelet 287    MPV 10.7 fL   .       Impression and Plan   Diagnosis     Iron deficiency anemia (LUK17-PB D50.0).     Esophageal Reflux Disease (BTS99-YN K21.9).     Osteoporosis (QWS42-JR M81.0).     Hypertension (CLY25-NB I10).         .) hypertension, uncontrolled (whitecoat component)   current antihypertensive regimen: begin on losartan 50mg qhs  regimen changes: none  intolerance: amlodipine (palpitations, bradycardia)  future titration/work-up plan:    - BP goal <140/90    .) GERD, h/o esophagitis   - grade C; EGD in '14 ([facility])   - pathology showing no evidence of Barretts esophagus   - maintained on omeprazole 20mg daily   - recommending H2 blocker for any breakthrough symptoms    .) h/o severe iron deficiency anemia - resolved   - Hgb 6.2 ('14) - related to esophagitis, normal colonoscopy in '14   - Hgb since normalized   - monitor CBC annually    .) health maintenance   - osteoporosis based on '16 DEXA     - on calcium/vitamin D daily     - last DEXA in '19     - on Reclast; advised to schedule now   - no further breast or CRC screening advised   - mixed hyperlipidemia; high HDL and LDL    - previously recommended coronary calcium scoring - did not complete    - on atorvastatin - subsequent normalization of LDL    .) Covid19 pandemic  - discussed importance of social distancing, hand  hygiene, and unique aspects related to individualized health  - discussed s/s and appropriate measures in context of worsening or developing respiratory symptoms     RTC in 6 weeks to reassess bp

## 2022-02-16 NOTE — TELEPHONE ENCOUNTER
---------------------  From: Chantelle Barnes RN (Phone Messages Pool ([id number]_[facility]))   Sent: 8/20/2021 3:24:20 PM CDT  Subject: BP     Time of Call:  1518      Person Calling:  patient  Phone number:  [phone]    Note:   She asks if she is due for service. I let her know yes fasting labs and HTN F/U. She agrees to schedule.

## 2022-02-16 NOTE — TELEPHONE ENCOUNTER
---------------------  From: Timbo Steinberg CMA ([facility] Message Pool ([id number]_[place]))   To: Appointment Pool ([id number]_[place]);     Sent: 12/17/2021 5:44:16 PM CST !  Subject: General Message     Pt needs FU with [facility] next week per [facility]ERT[facility] regarding HTN.  Please help her schedule  Timbo Steinberg CMA

## 2022-02-16 NOTE — CARE COORDINATION
Pt appears on [facility] chronic disease panel as out of parameters for elevated BP of 170/80. No further CC f/u needed at this time per [facility] note.     hypertension, controlled (whitecoat component) - not wanting to pursue further therapies for now  current antihypertensive regimen: none  regimen changes: none  intolerance: amlodipine (palpitations, bradycardia)  future titration/work-up plan:    - BP goal <140/90

## 2022-02-16 NOTE — PROGRESS NOTES
Patient:   SURJIT BOURNE            MRN: [id number]            FIN: [id number]               Age:   80 years     Sex:  Female     :  1940   Associated Diagnoses:   Osteoporosis; Blood pressure elevated   Author:   Mg Aguiar MD      Visit Information      Date of Service: 09/10/2021 11:09 am  Performing Location: [facility]  Encounter#: [id number]      Primary Care Provider (PCP):  Mg Aguiar MD    NPI# [id number]      Referring Provider:  Mg Aguiar MD    NPI# [id number]      Chief Complaint   9/10/2021 11:20 AM CDT   HTN f/u and labs      History of Present Illness   Surjit is an 80 year old female who is here to follow up on recent labs. She was recently started on losartan for BP management and she has been tolerating it well, except for what sounds like some orthostatic hypotension at times when standing. She denies any chest pain, shortness of breath, abdominal pain, or changes in bowel or bladder habits. Dr. Mg Aguiar and I sat down and reviewed labs with her.     Surjit also has a history of osteoporosis for which she had been receiving Zolendronic Acid infusions. Due to an abnormal creatinine and due to the pandemic, she has not received an infusion for the past 1.5 years.       Review of Systems   Constitutional:  Negative.    Eye:  Negative.    Ear/Nose/Mouth/Throat:  Negative.    Respiratory:  Negative.    Cardiovascular:  Negative.    Gastrointestinal:  Negative.    Genitourinary:  Negative.    Gynecologic:  Negative.    Hematology/Lymphatics:  Negative.    Endocrine:  Negative.    Immunologic:  Negative.    Musculoskeletal:  Negative.    Integumentary:  Negative.    Neurologic:  Negative.    Psychiatric:  Negative.    All other systems reviewed and negative      Health Status   Allergies:    Allergic Reactions (All)  Severity Not Documented  Acetaminophen (Nausea and vomiting)  Aciphex (Upset stomach)  Codeine (Vomit)  HYDROcodone (Nausea and vomiting)  TraMADol  (Vomit)  Canceled/Inactive Reactions (All)  Severity Not Documented  Coumadin (Vomit)   Medications:  (Selected)   Prescriptions  Prescribed  atorvastatin 20 mg oral tablet: = 1 tab(s), Oral, daily, # 90 tab(s), 2 Refill(s), Type: Maintenance, Pharmacy: [facility] [id number] [facility], Pt due for annual med check for further refills., 1 tab(s) Oral daily, 64.5, in, 01/14/21 13:51:00 CST, Height Measured, 163, lb, 01/14/21 13:51:00 CS...  calcium carbonate 500 mg (200 mg elemental calcium) oral tablet, chewable: = 1 tab(s) ( 500 mg ), Chewed, bid, # 120 tab(s), 0 Refill(s), Type: Maintenance, OTC (Rx)  losartan 50 mg oral tablet: = 1 tab(s) ( 50 mg ), Oral, daily, # 90 tab(s), 2 Refill(s), Type: Maintenance, Pharmacy: [facility] [id number] [facility], 1 tab(s) Oral daily, 64.5, in, 01/14/21 13:51:00 CST, Height Measured, 163, lb, 01/14/21 13:51:00 CST, Weight Measured  omeprazole 20 mg oral delayed release capsule: = 1 cap(s) ( 20 mg ), Oral, daily, # 90 cap(s), 2 Refill(s), Type: Maintenance, Pharmacy: [facility] [id number] [facility], keep on file and pt will notify when needed, 1 cap(s) Oral daily, 64.5, in, 01/14/21 13:51:00 CST, Height Measured, 163, lb, 01/14/21 13:51:...  Documented Medications  Documented  ibuprofen: po, prn, PRN: as needed for pain, 0 Refill(s), Type: Maintenance,    Medications          *denotes recorded medication          atorvastatin 20 mg oral tablet: 1 tab(s), Oral, daily, 90 tab(s), 2 Refill(s).          calcium carbonate 500 mg (200 mg elemental calcium) oral tablet, chewable: 500 mg, 1 tab(s), Chewed, bid, 120 tab(s), 0 Refill(s).          *ibuprofen: po, prn, PRN: as needed for pain, 0 Refill(s).          losartan 50 mg oral tablet: 50 mg, 1 tab(s), Oral, daily, 90 tab(s), 2 Refill(s).          omeprazole 20 mg oral delayed release capsule: 20 mg, 1 cap(s), Oral, daily, 90 cap(s), 2 Refill(s).       Problem list:    All Problems (Selected)  Blood pressure elevated / SNOMED CT 376374962 / Confirmed  Esophageal reflux  disease / SNOMED CT 637362617 / Confirmed  History of chickenpox / SNOMED CT 414209236 / Confirmed  Iron deficiency anemia / SNOMED CT 704416910 / Confirmed  Obesity / SNOMED CT 3403198398 / Probable  OA (osteoarthritis) / SNOMED CT 5383766351 / Confirmed  Osteoporosis / SNOMED CT 542719671 / Confirmed  Pure hypercholesterolemia / SNOMED CT 249485137 / Confirmed      Histories   Past Medical History:    Active  OA (osteoarthritis) (7049015917): Onset in  at 66 years.  Comments:  2012 CST 1:20 PM Gina Harmon  Right knee  Esophageal reflux disease (672988910)  Obesity (5475737590)  Blood pressure elevated (471656323)  Iron deficiency anemia (469457107)  History of chickenpox (515148978)  Resolved  Seizures (351529723): Onset in  at 20 years.  Resolved.  Comments:  2012 CST 10:13 AM Davina Mccann  Of unknown origin.  Acute lower UTI (urinary tract infection) (4294116992):  Resolved.  Bilateral carpal tunnel syndrome (77141483):  Resolved.  Head trauma (704823108):  Resolved.  Comments:  2014 CST 3:38 PM DENICE Santana Davina  Fell back on ice with head wound.  No sequelae.  Pregnancy (684589177):  Resolved in  at 26 years.  Pregnancy (327884092):  Resolved in  at 28 years.  Pregnancy (100853495):  Resolved in  at 30 years.   Family History:    Cancer  Father ()  Comments:  2012 10:08 AM Davina Mccann  Prostate cancer.    2010 1:33 PM Angelique Montemayor  Stomach, Prostate cancer  Asthma  Father ()  Breast cancer  Mother ()  Sister (Guadalupe)  Sister (Nereida)  Hypertension  Mother ()  High blood pressure  Sister (Guadaluep)  Sister (Nereida)  Arthritis  Father ()  Mother ()  Congestive heart failure  Mother ()  Comments:  2012 10:18 AM Davina Mccann  Cunningham's lung.  Seasonal allergy  Daughter  Son (Faraz)     Procedure history:    Release of trigger finger (948247452) in the month of 2020 at  79 Years.  Comments:  12/3/2020 10:43 AM DENICE Almanza Juan Adee dee Chelo SCHILLING  left finger  Colonoscopy (547740141) in the month of 10/2014 at 73 Years.  Comments:  2016 12:58 PM MARII Almanza Chelo Buchanan CMA  few diverticula, otherwise normal  Upper GI endoscopy (3108973586) in the month of 10/2014 at 73 Years.  Bone density scan (974177084) in the month of 2009 at 68 Years.  Cataract extraction (01581494) in  at 66 Years.  Comments:  2010 1:30 PM Angelique Montemayor 2006. R. 2007  Colonoscopy (653515700) on 10/6/2005 at 64 Years.  Comments:  2012 10:03 AM DENICE Buchanan Chelo SCHILLING  Normal - no polyps/no masses  Excision left cheek. on 2005 at 64 Years.  excision of thyroid nodule in the month of 2001 at 60 Years.  Vaginal hysterectomy (810946175) in the month of 1998 at 58 Years.  Cholecystectomy (06099645) in  at 42 Years.  Childbirth (0309110194).  Comments:  2010 1:31 PM Angelique Montemayor   3, Para 3  Blepharoplasty of upper eyelid (66188591).  Comments:  2012 1:19 PM Gina Harmon  Ptosis  Hysterectomy (446403161).  Comments:  2014 3:37 PM Davina Mccann  With one ovary remaining.  Release of trigger thumb (943250832).  Comments:  2017 1:04 PM MARII Almanza Chelo Buchanan CMA  right   Social History:        Electronic Cigarette/Vaping Assessment            Electronic Cigarette Use: Never.      Alcohol Assessment            Past      Tobacco Assessment            Never (less than 100 in lifetime)      Substance Abuse Assessment            Never      Employment and Education Assessment            Work/School description: teacher.            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      Home and Environment Assessment            Marital status: .  Spouse/Partner name: Himanshu.      Nutrition and Health Assessment            Type of diet: Regular.      Exercise and Physical Activity Assessment                     Comments:        "               09/14/2017 - Chelo Gonzales                     Walking, biking, \"some leg and back weights\".  Five times per week.      Other Assessment            Marital status,         Physical Examination   Vital Signs   9/10/2021 11:20 AM CDT Temperature Tympanic 97.1 DegF  LOW    Peripheral Pulse Rate 65 bpm    Systolic Blood Pressure 136 mmHg  HI    Diastolic Blood Pressure 88 mmHg  HI    Mean Arterial Pressure 104 mmHg    BP Site Right arm    BP Method Manual    Oxygen Saturation 96 %      Measurements from flowsheet : Measurements   9/10/2021 11:20 AM CDT Height Measured - Standard 64 in    Height/Length Estimated 64.5 in    Weight Measured - Standard 159 lb    BSA 1.8 m2    Body Mass Index 27.29 kg/m2  HI      General: pleasant female who appears her stated age, and is in no apparent distress  HEENT: TMs visualized bilaterally, w/o bulging and erythema  Cardiac: normal rate, regular rhythm w/o rubs, murmurs, or gallops  Pulmonary: CTAB  Abdominal: Non-Distended, BS+, Non-Tender to Palpation in all 4 quadrants         Health Maintenance      Recommendations     Pending (in the next year)        Due            Influenza Vaccine due  09/01/21  and every 1  year(s)           Type 2 Diabetes Mellitus Screen due  09/10/21  Variable frequency        Due In Future            Depression Screen not due until  12/03/21  and every 1  year(s)           Osteoporosis Screen not due until  12/05/21  and every 2  year(s)           Lipid Disorders Screen not due until  09/01/22  and every 1  year(s)     Satisfied (in the past 1 year)        Satisfied            Body Mass Index Check on  09/10/21.           Body Mass Index Check on  01/14/21.           Body Mass Index Check on  12/03/20.           COVID-19 Vaccine (Moderna) Dose 2 on  03/04/21.           COVID-19 Vaccine (Moderna) Dose 1 on  02/04/21.           Depression Screen on  12/03/20.           Fall Risk Screen on  09/10/21.           High Blood Pressure Screen " on  09/10/21.           High Blood Pressure Screen on  01/14/21.           High Blood Pressure Screen on  12/03/20.           Influenza Vaccine on  09/30/20.           Lipid Disorders Screen on  09/01/21.           Lipid Disorders Screen on  09/01/21.           Lipid Disorders Screen on  09/01/21.           Lipid Disorders Screen on  09/01/21.           Lipid Disorders Screen on  11/25/20.           Lipid Disorders Screen on  11/25/20.           Lipid Disorders Screen on  11/25/20.           Lipid Disorders Screen on  11/25/20.           Obesity Screen and Counseling on  09/10/21.           Obesity Screen and Counseling on  01/14/21.           Obesity Screen and Counseling on  12/03/20.           Tobacco Use Screen on  09/10/21.           Tobacco Use Screen on  01/14/21.           Tobacco Use Screen on  12/03/20.          Review / Management   Results review:  Lab results   9/1/2021 9:38 AM CDT Sodium Level 137 mmol/L    Potassium Level 4.9 mmol/L    Chloride Level 103 mmol/L    CO2 Level 27 mmol/L    Glucose Level 94 mg/dL    BUN 18 mg/dL    Creatinine 0.89 mg/dL  HI    BUN/Creat Ratio 20    eGFR 61 mL/min/1.73m2    eGFR African American 70 mL/min/1.73m2    Calcium Level 9.8 mg/dL    Cholesterol 177 mg/dL    Non-    HDL 71 mg/dL    Chol/HDL Ratio 2.5    LDL 91    Triglyceride 61 mg/dL    WBC 7.4    RBC 4.07    Hgb 12.7 gm/dL    Hct 38.4 %    MCV 94.3 fL    MCH 31.2 pg    MCHC 33.1 gm/dL    RDW 12.3 %    Platelet 315    MPV 10.2 fL   .       Impression and Plan   Diagnosis     Osteoporosis (GAM50-LO M81.0).     Blood pressure elevated (EAQ62-AA I10).     Course:  Progressing as expected.    Plan:    #Essential Hypertension  Well controlled. Labs after losartan initiation are unremarkable.   - continue Losartan 50 mg  - continue to monitor orthostatic hypotension     #Osteorporosis  - encourage to restart Zolendronic Acid infusions.  Schedule through [facility].    Patient Instructions:       Counseled: Patient.     Orders     Orders (Selected)   Outpatient Orders  Ordered  Return to Clinic (Request): Return in 6 months; fasting lipid panel, BMP, CBC  Prescriptions  Prescribed  losartan 50 mg oral tablet: = 1 tab(s) ( 50 mg ), Oral, daily, # 90 tab(s), 2 Refill(s), Type: Maintenance, Pharmacy: [id number] [facility], 1 tab(s) Oral daily, 64.5, in, 01/14/21 13:51:00 CST, Height Measured, 163, lb, 01/14/21 13:51:00 CST, Weight Measured.

## 2022-02-16 NOTE — PROGRESS NOTES
Patient:   SURJIT BOURNE            MRN: [id number]            FIN: [id number]               Age:   77 years     Sex:  Female     :  1940   Associated Diagnoses:   Iron deficiency anemia; Esophageal Reflux Disease; Osteoporosis; Hypertension   Author:   Mg Aguiar MD      Visit Information      Date of Service: 2018 07:45 am  Performing Location: [facility]  Encounter#: [id number]      Primary Care Provider (PCP):  Mg Aguiar MD    NPI# [id number]      Referring Provider:  Mg Aguiar MD    NPI# [id number]      Chief Complaint   2018 7:53 AM CDT    1.  BP check  2.  warts back of knees and check spot on left temple              Additional Information:No additional information recorded during visit.   Chief complaint and symptoms as noted above and confirmed with patient.  Recent lab and diagnostic studies reviewed with patient      History of Present Illness     2018: Jo Ann returns for general follow-up.  Overall feeling okay.  She says she feels more weak and tired than she remembers.  Asked about last available iron and hemoglobin levels from this past fall.  Denies any reflux symptoms.  No stool changes.  Generally describes having issues with swallowing pills feeling like it lodged in her throat.  No other dysphasia symptoms.  Not checking blood pressures at all at home.    3/15/2018: Return to clinic for follow-up related to her blood pressure.  She has been maintaining a home blood pressure log over the past several weeks.  Home systolic pressures typically ranging from 160 to high 140s.  Currently not on medication therapy.  She is leaving for [place] this coming week.    3/29/2018: Jo Ann presents to clinic originally accompanied Parker for his visit.  She had blood pressure checked by nursing staff and during routine check was noted to have an heart rate of 36 which was confirmed by manual measurement.  She recently started on amlodipine after returning from the  cruise.  She is taken a total of 3 days.  Otherwise feeling well.  No syncopal events.  Uneventful cruise    5/3/2018: Returns to clinic for follow-up related to her hypertension.  She has remained on amlodipine.  However describes a frequent pounding like sensation in her chest primarily when at rest or trying to sleep.  She feels like her heart was pounding out of her chest.  She will check her pulse rate and blood pressure at home and states that heart rates typically in the 40s or 50s.  Nevers measures any tachycardia.  Denies any presyncopal symptoms or chest pain.  No limitations of activity and states feeling typically goes away at that time.  She associates the timeframe of symptoms since being on amlodipine    9/19/2018: Presents for follow-up.  She has remained off of any antihypertensive medicines since her last visit.  She states that previous complaints of palpitation and low heart rates resolves after discontinuing amlodipine.  Generally has felt well.  Complaints of multiple warts behind her knees and states the lesion on behind her right knee particularly troublesome constantly catching on clothing and pantyhose; would like removed.         Review of Systems   Constitutional:  No fever, No chills, No weakness, No fatigue.    Eye:  Negative except as documented in history of present illness.    Ear/Nose/Mouth/Throat:  Negative except as documented in history of present illness.    Respiratory:  No shortness of breath, No cough.    Cardiovascular:  No chest pain, No palpitations, No peripheral edema, No syncope.    Gastrointestinal:  No nausea, No vomiting, No abdominal pain.    Genitourinary:  No dysuria, No hematuria.    Hematology/Lymphatics:  Negative except as documented in history of present illness.    Endocrine:  No excessive thirst, No polyuria.    Immunologic:  No recurrent fevers.    Musculoskeletal:  No joint pain, No muscle pain.    Integumentary:  Skin lesion.    Neurologic:  Alert and  oriented X4, No numbness, No tingling, No headache.       Health Status   Allergies:    Allergic Reactions (Selected)  Severity Not Documented  Acetaminophen (Nausea and vomiting)  Aciphex (Upset stomach)  Codeine (Vomit)  HYDROcodone (Nausea and vomiting)  TraMADol (Vomit)   Medications:  (Selected)   Prescriptions  Prescribed  atorvastatin 20 mg oral tablet: See Instructions, Instructions: TAKE 1 TABLET EVERY DAY, # 30 tab(s), 0 Refill(s), Type: Soft Stop, Pharmacy: [facility] [id number] [facility], TAKE 1 TABLET EVERY DAY  omeprazole 20 mg oral delayed release capsule: = 1 cap(s) ( 20 mg ), Oral, daily, # 90 cap(s), 0 Refill(s), Type: Maintenance, Pharmacy: [id number]  Documented Medications  Documented  OsCal 500 oral tablet, chewable: 3 tab(s) ( 3,750 mg ), daily, Instructions: contains vitamin D3 600IU, 0 Refill(s), Type: Maintenance  ibuprofen: po, prn, PRN: as needed for pain, 0 Refill(s), Type: Maintenance   Problem list:    All Problems  Blood pressure elevated / SNOMED CT 326227535 / Confirmed  Esophageal reflux disease / SNOMED CT 852797233 / Confirmed  History of chickenpox / SNOMED CT 616389648 / Confirmed  Iron deficiency anemia / SNOMED CT 623763390 / Confirmed  Obesity / SNOMED CT 5986441361 / Probable  OA (osteoarthritis) / SNOMED CT 9131383132 / Confirmed  Osteoporosis / SNOMED CT 083511168 / Confirmed  Pure hypercholesterolemia / SNOMED CT 624558977 / Confirmed  Inactive: Thyroid nodule / SNOMED CT 708041430  Resolved: Acute lower UTI (urinary tract infection) / SNOMED CT 9555853162  Resolved: Bilateral carpal tunnel syndrome / SNOMED CT 82922045  Resolved: Head trauma / SNOMED CT 765153787  Resolved: Pregnancy / SNOMED CT 443577584  Resolved: Pregnancy / SNOMED CT 937277035  Resolved: Pregnancy / SNOMED CT 845093134  Resolved: Seizures / SNOMED CT 866723006  Canceled: Obesity / ICD-9-.00      Histories   Past Medical History:    Active  OA (osteoarthritis) (0423677815): Onset in 2006 at 66  years.  Comments:  2012 CST 1:20 PM CST - Gina Padilla  Right knee  Esophageal reflux disease (133170067)  Obesity (1007446219)  Blood pressure elevated (256216159)  Iron deficiency anemia (242429547)  History of chickenpox (054877972)  Resolved  Seizures (284761186): Onset in  at 20 years.  Resolved.  Comments:  2012 CST 10:13 AM CST - Max  Davina  Of unknown origin.  Acute lower UTI (urinary tract infection) (6038067689):  Resolved.  Bilateral carpal tunnel syndrome (01281983):  Resolved.  Head trauma (106318727):  Resolved.  Comments:  2014 CST 3:38 PM CST - Max Davina  Fell back on ice with head wound.  No sequelae.  Pregnancy (217129496):  Resolved in  at 26 years.  Pregnancy (582470734):  Resolved in  at 28 years.  Pregnancy (779692230):  Resolved in  at 30 years.   Family History:    Cancer  Father ()  Comments:  2012 10:08 AM - Davina Santana  Prostate cancer.    2010 1:33 PM - Angelique Singleton  Stomach, Prostate cancer  Asthma  Father ()  Breast cancer  Mother ()  Sister (Guadalupe)  Sister (Nereida)  Hypertension  Mother ()  High blood pressure  Sister (Guadalupe)  Sister (Nereida)  Congestive heart failure  Mother ()  Comments:  2012 10:18 AM - Davina Santana  Cunningham's lung.  Seasonal allergy  Daughter  Son (Faarz)     Procedure history:    Colonoscopy (336529467) in the month of 10/2014 at 73 Years.  Comments:  2016 12:58 PM - BranstChelo funez CMA  few diverticula, otherwise normal  Upper GI endoscopy (3631250111) in the month of 10/2014 at 73 Years.  Bone density scan (575430577) in the month of 2009 at 68 Years.  Cataract extraction (42522786) in  at 66 Years.  Comments:  2010 1:30 PM - Angelique Singleton 2006. R. 2007  Colonoscopy (122291878) on 10/6/2005 at 64 Years.  Comments:  2012 10:03 AM - BranstChelo funez CMA  Normal - no polyps/no masses  Excision left cheek. on 2005 at 64 Years.  excision of  "thyroid nodule in the month of 2001 at 60 Years.  Vaginal hysterectomy (748538322) in the month of 1998 at 58 Years.  Cholecystectomy (58128650) in  at 42 Years.  Childbirth (0591733792).  Comments:  2010 1:31 PM - Angelique Singleton   3, Para 3  Blepharoplasty of upper eyelid (66646232).  Comments:  2012 1:19 PM - Gina Padilla  Ptosis  Hysterectomy (556724504).  Comments:  2014 3:37 PM - Davina Santana  With one ovary remaining.  Release of trigger thumb (668002853).  Comments:  2017 1:04 PM - Chelo Buchanan CMA  right   Social History:        Alcohol Assessment            Past      Tobacco Assessment            Never      Substance Abuse Assessment            Never      Employment and Education Assessment            Work/School description: teacher.            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      Home and Environment Assessment            Marital status: .  Spouse/Partner name: Himanshu.      Nutrition and Health Assessment            Type of diet: Regular.      Exercise and Physical Activity Assessment                     Comments:                      2017 - Chelo Gonzales                     Walking, biking, \"some leg and back weights\".  Five times per week.      Other Assessment            Marital status,         Physical Examination   vital signs stable, as noted above   Vital Signs   2018 7:53 AM CDT Temperature Tympanic 97.6 DegF  LOW    Peripheral Pulse Rate 64 bpm    Pulse Site Radial artery    Systolic Blood Pressure 170 mmHg  HI    Diastolic Blood Pressure 80 mmHg    Mean Arterial Pressure 110 mmHg    BP Systolic Repeat 180 mmHg    BP Diastolic Repeat 80 mmHg      Measurements from flowsheet : Measurements   2018 7:53 AM CDT    Weight Measured - Standard                159 lb     General:  Alert and oriented, No acute distress.    Respiratory:  Lungs are clear to auscultation, Respirations are non-labored.  "   Cardiovascular:  Normal rate, Regular rhythm, No murmur.    Gastrointestinal:  Soft, Non-tender.    Integumentary:  common, keratinized wart on popliteal fossa behind left knee, simple wart on right popliteal fossa.    Neurologic:  Alert, Oriented.    Cognition and Speech:  Oriented, Speech clear and coherent.    Psychiatric:  Appropriate mood & affect.       Review / Management   Results review      Impression and Plan   Diagnosis     Iron deficiency anemia (EFO94-QZ D50.0).     Esophageal Reflux Disease (CDF71-IF K21.9).     Osteoporosis (GPI63-GP M81.0).     Hypertension (ICZ44-SY I10).         .) multiple warts behind knees   - wart behind left knee removed with scalpel blade   - remnants of left lesion and right lesion both freezed with cryotherapy    .) hypertension, controlled (whitecoat component) - not wanting to pursue further therapies for now  current antihypertensive regimen: none  regimen changes: none  intolerance: amlodipine (palpitations, bradycardia)  future titration/work-up plan:    - BP goal <140/90    plan as previously outlined:     .) GERD, esophagitis   - grade C; EGD in '14 ([facility])   - pathology showing no evidence of Barretts esophagus   - maintained on omeprazole 20mg daily   - recommending H2 blocker for any breakthrough symptoms    .) h/o severe iron deficiency anemia - resolved   - Hgb 6.2 ('14) - related to esophagitis, normal colonocopy in '14   - Hgb since normalized   - monitor CBC annually    .) health maintenance   - osteoporosis based on '16 DEXA     - on calcium/vitamin D daily     - on Reclast; next dose due in 2/2019   - mixed hyperlipidemia; high HDL and LDL    - previously recommended coronary calcium scoring - did not complete    RTC annually

## 2022-02-16 NOTE — NURSING NOTE
Vital Signs Entered On:  12/27/2021 2:27 PM CST    Performed On:  12/27/2021 2:15 PM CST by Joy Yeboah CMA               Vital Signs   Systolic Blood Pressure :   145 mmHg (HI)    Diastolic Blood Pressure :   78 mmHg   Mean Arterial Pressure :   100 mmHg   BP Site :   Right arm   Peripheral Pulse Rate :   51 bpm (LOW)    Joy Yeboah CMA - 12/27/2021 2:27 PM CST

## 2022-02-16 NOTE — LETTER
(Inserted Image. Unable to display)     September 20, 2019      SURJIT BOURNE  [address]  [place], [place] [id number]          Dear SURJIT,      Thank you for selecting [facility] ([facility]) for your healthcare needs.    Our records indicate you are due for the following services:    Medicare Annual Wellness Visit.    Fasting Lab Tests ~ Please do not eat or drink anything 10 hours prior to your scheduled appointment time.  (Water and any medications that you may need are allowed unless directed otherwise.)    If you had your labs done at another facility or with Direct Access Lab Testing at [facility], please bring in a copy of the results to your next visit, mail a copy, or drop off a copy of your results to your Healthcare Provider.    You are due for lab work and an office visit; please schedule the lab appointment 1 week before the office visit.  This will assure all results are available to discuss with your provider during your visit.    **It is very helpful if you bring your medication bottles to your appointment.  This assures we have all of your current medications, including strength and dosing information, documented accurately in your medical record.    To schedule an appointment or if you have further questions, please contact your primary clinic:   [facility]       [phone]   [facility]       [phone]              [facility]     [phone]      Powered by [employer]    Sincerely,    Mg Aguiar MD

## 2022-02-16 NOTE — LETTER
(Inserted Image. Unable to display)   July 19, 2021    [place] [address]            Dear SURJIT,      Thank you for selecting [facility] for your healthcare needs.    Our records indicate you are due for the following services:    Hypertension check ~ please remember to bring your at-home blood pressure readings with you to your appointment.     Fasting Lab Tests ~ Please do not eat or drink anything 10 hours prior to your scheduled appointment time.  (Water and any medications that you may need are allowed unless directed otherwise.)    If you had your labs done at another facility or with Direct Access Lab Testing at [facility], please bring in a copy of the results to your next visit, mail a copy, or drop off a copy of your results to your Healthcare Provider.    (FYI   Regarding office visits: In some instances, a video visit or telephone visit may be offered as an option.)    You are due for lab work and an office visit; please schedule the lab appointment 1 week before the office visit.  This will assure all results are available to discuss with your Healthcare Provider during your visit.    **It is very helpful if you bring your medication bottles to your appointment.  This assures we have all of your current medications, including strength and dosing information, documented accurately in your medical record.    To schedule an appointment or if you have further questions, please contact your clinic at [phone].      Powered by [employer]    Sincerely,    Mg Aguiar MD

## 2022-02-16 NOTE — TELEPHONE ENCOUNTER
Entered by Gina Walton MA on November 24, 2020 1:12:11 PM CST  ---------------------  From: Gina Walton MA   To: [facility] [id number] [facility]    Sent: 11/24/2020 1:12:11 PM CST  Subject: Medication Management     ** Rx Change Denied: Patient needs appointment, will get refills at 12/3/2020 appt **  (ATORVASTATIN 20 MG TABLET)   TAKE 1 TABLET BY MOUTH EVERY DAY  Qty:  30 tab(s)        Days Supply:  30        Refills:  0          Substitutions Allowed     Route To Pharmacy - [place] [id number] [facility]   Note from Pharmacy:  REQUEST FOR 90 DAYS PRESCRIPTION.  Signed by Gina Walton MA            From: [facility] [id number] [facility]  To: Mg Aguiar MD  Sent: November 23, 2020 1:09:41 PM CST  Subject: Medication Management  Due: November 24, 2020 1:09:41 PM CST     Originally Prescribed Drug:  Drug: atorvastatin (atorvastatin 20 mg oral tablet), TAKE 1 TABLET BY MOUTH EVERY DAY  Quantity: 30 tab(s)  Days Supply: 30  Refills: 0  Substitutions Allowed  Notes from Pharmacy: REQUEST FOR 90 DAYS PRESCRIPTION.     ** On Hold Pending Signature **  Preferred Alternative Drug: atorvastatin (atorvastatin 20 mg oral tablet), TAKE 1 TABLET BY MOUTH EVERY DAY  Quantity: 90 tab(s)  Days Supply: 90  Refills: 1  Substitutions Allowed  Notes from Pharmacy: REQUEST FOR 90 DAYS PRESCRIPTION.

## 2022-02-16 NOTE — TELEPHONE ENCOUNTER
---------------------  From: Macey Prieto RN (Phone Messages Pool ([id number]_[facility]))   To: Phone Messages Pool ([id number]_[place] [facility] [place]);     Sent: 9/27/2021 9:57:44 AM CDT  Subject: infusion       PCP: ASHLEY      Time of Call: 9:22am       Person Calling:  pt  Phone number:  [phone]    Returned call at: 9:54am, left generic VM, requesting return call.     Note:  VM received from pt, stating [facility] sent an order to [facility] for an infusion for the bones about 2 weeks ago and she has not heard anything.     Last office visit and reason:  9/10/21 lab f/u CLEMENTEpoke with pt, discussed order for infusion. Pt given phone number for infusion center at [facility]. Advised pt to call back with further questions/concerns. Pt verbalized appreciation.

## 2022-02-16 NOTE — TELEPHONE ENCOUNTER
---------------------  From: Leo SCHILLINGTere   Sent: 1/25/2021 3:03:16 PM CST  Subject: Med Refills/Pharmacy     Phone Message:      PCP: ASHLEY    Person Calling: Kathryn  Phone: [phone]  Time: 2:00pm    Reason for call: Pt called stating that she has always gotten her medications refilled at [facility] pharmacy but due to Covid had [facility] team change her last refills to [facility] pharmacy so they could use the drive thru and not enter the building. are just informed them that [facility] pharmacy is not covered under husbands insurance so needs refills sent back to [facility] pharmacy but at the [facility] [place] location. Informed pt I will resend Rx refills that were just sent in December. Pt did already  the #90 from last month so just needing remaining 2 refills sent in.

## 2022-02-16 NOTE — TELEPHONE ENCOUNTER
---------------------  From: Dee Real CMA (Phone Messages Pool ([id number]_[facility]))   To: Mg Aguiar MD;     Sent: 6/1/2021 9:41:24 AM CDT  Subject: Deer tick exposure     PCP:   ASHLEY     Time of Call:  9:35am       Person Calling:  Pt  Phone number:  [phone]    Note:   Pt calls stating she found a deer tick on her (embedded overnight). She was able to remove successfully. States her arm is slightly sore. She scheduled an appt w/ you tomorrow but concerned about waiting that long. I advised OK to wait until tomorrow. Advised to monitor area, keep clean and OK to use abx oint and bandage if needed. Pt agreed.     Just an FYI of advice given.

## 2022-02-16 NOTE — PROGRESS NOTES
Patient:   SURJIT BOURNE            MRN: [id number]            FIN: [id number]               Age:   80 years     Sex:  Female     :  1940   Associated Diagnoses:   1st degree AV block; PVC (premature ventricular contraction)   Author:   Cosme BOWENS, Mg      Procedure   EKG procedure   Indication: abnormal pulse.     Position: supine.     EKG findings   Interpretation: by primary care provider.     Rhythm: heart rate  64  beats/min, sinus (normal, not bradycardia).     Axis: normal axis, normal configuration.     Within normal limits.     Intervals: OH, QRS normal, QT normal, 1st degree AV block.     Normal EKG.     P waves: normal.     QRS complex: normal, frequent PVCs, no Q waves present.     ST-T-U complex: normal.     Interpretation: no ST-T wave abnormalities.     Discussed: with patient.        Impression and Plan   Diagnosis     1st degree AV block (SRQ64-VL I44.0).     PVC (premature ventricular contraction) (WMO48-KF I49.3).     Orders   no high grade block

## 2022-02-16 NOTE — PROGRESS NOTES
Patient:   SURJIT BOURNE            MRN: [id number]            FIN: [id number]               Age:   76 years     Sex:  Female     :  1940   Associated Diagnoses:   Iron deficiency anemia; Esophageal Reflux Disease; Osteoporosis; Encounter for removal of sutures; Cystitis without hematuria   Author:   Mg Aguiar MD      Visit Information      Date of Service: 2017 12:38 pm  Performing Location: [facility]  Encounter#: [id number]      Primary Care Provider (PCP):  Mg Aguiar MD    NPI# [id number]      Referring Provider:  No referring provider recorded for selected visit.      Chief Complaint   2017 1:15 PM CST    Patient presents today for a follow up for acid reflux. He also presents today frequency of urination x 1 week after her right wrist carple tunnel surgery. Would like to discuss the risks and benifits of going back on baby aspirin. She d/c per [place].              Additional Information:No additional information recorded during visit.   Chief complaint and symptoms as noted above and confirmed with patient.  Recent lab and diagnostic studies reviewed with patient      History of Present Illness   2016: Surjit presents to clinic to establish care, previously was following with both Kathleen Cisneros and Emiliano.  She has a history of gastroesophageal reflux disease discovered as part of a [facility] evaluation for profound anemia.  She describes undergoing endoscopy including EGD and colonoscopy in  showing severe underlying esophagitis.  Interestingly denies having any heartburn symptoms.  She was maintained on twice daily PPI which has subsequently been reduced to once daily PPI.  She did require blood transfusions at that time.  Currently describing general weakness and achiness feeling.  Wondering if she is not anemic again.  Last testing in April of this year showed a hemoglobin 13.4 with a ferritin of 36.  She is currently not maintained on any oral iron  replacement.    2/14/2017: Presents for removal of right hand sutures after recent carpal tunnel surgery 2 weeks ago.  Also describes having increased urinary frequency and dysuria         Review of Systems   Constitutional:  Weakness, Fatigue, No fever, No chills.    Eye:  Negative except as documented in history of present illness.    Ear/Nose/Mouth/Throat:  Negative except as documented in history of present illness.    Respiratory:  Cough, No shortness of breath.    Cardiovascular:  No chest pain, No palpitations, No peripheral edema, No syncope.    Gastrointestinal:  No nausea, No vomiting, No abdominal pain.    Genitourinary:  No dysuria, No hematuria.    Hematology/Lymphatics:  Negative except as documented in history of present illness.    Endocrine:  No excessive thirst, No polyuria.    Immunologic:  No recurrent fevers.    Musculoskeletal:  Muscle pain, No joint pain.    Neurologic:  Alert and oriented X4, No numbness, No tingling, No headache.       Health Status   Allergies:    Allergic Reactions (Selected)  Severity Not Documented  Acetaminophen (Nausea and vomiting)  Aciphex (Upset stomach)  Codeine (Vomit)  HYDROcodone (Nausea and vomiting)  TraMADol (Vomit)   Medications:  (Selected)   Prescriptions  Prescribed  Macrobid 100 mg oral capsule: 1 cap(s) ( 100 mg ), PO, BID, # 10 cap(s), 0 Refill(s), Type: Maintenance, Pharmacy: [facility] [id number] [facility], 1 cap(s) po bid,x5 day(s)  omeprazole 20 mg oral delayed release capsule: 1 cap(s) ( 20 mg ), PO, Daily, # 90 cap(s), 3 Refill(s), Type: Maintenance, Pharmacy: [facility] [id number] [facility], 1 cap(s) po daily  Documented Medications  Documented  OsCal 500 oral tablet, chewable: 3 tab(s) ( 3,750 mg ), daily, Instructions: contains vitamin D3 600IU, 0 Refill(s), Type: Maintenance  ibuprofen: po, prn, PRN: as needed for pain, 0 Refill(s), Type: Maintenance   Problem list:    All Problems  Blood Pressure Elevated / ICD-9-.2 / Confirmed  Esophageal Reflux Disease /  ICD-9-.81 / Confirmed  Iron deficiency anemia / SNOMED CT 567088185 / Confirmed  OA (Osteoarthritis) / ICD-9-.90 / Confirmed  Obesity / ICD-9-.00 / Probable  Osteoporosis / SNOMED CT 449681601 / Confirmed  Pure hypercholesterolemia / SNOMED CT 568861712 / Confirmed  Inactive: Thyroid nodule / ICD-9-.0  Resolved: Bilateral carpal tunnel syndrome / SNOMED CT 23749485  Resolved: Head trauma / SNOMED CT 539656145  Resolved: Seizures / ICD-9-.39  Resolved: UTI (Lower Urinary Tract Infection) / ICD-9-.0  Canceled: Obesity / ICD-9-.00      Histories   Past Medical History:    Active  OA (Osteoarthritis) (715.90): Onset in  at 65 years.  Comments:  2012 CST 1:20 PM CST - Gina Padilla  Right knee  Esophageal Reflux Disease (530.81)  Iron deficiency anemia (694624738)  Resolved  Seizures (780.39): Onset in  at 20 years.  Resolved.  Comments:  2012 CST 10:13 AM CST - Davina Santana  Of unknown origin.  UTI (Lower Urinary Tract Infection) (599.0):  Resolved.  Bilateral carpal tunnel syndrome (43505444):  Resolved.  Head trauma (776321886):  Resolved.  Comments:  2014 CST 3:38 PM CST - Max Davina  Fell back on ice with head wound.  No sequelae.   Family History:    Cancer  Father ()  Comments:  2012 10:08 AM - Davina Santana  Prostate cancer.    2010 1:33 PM - Angelique Singleton  Stomach, Prostate cancer  Asthma  Father ()  Breast cancer  Mother ()  Sister (Guadalupe)  Sister (Nereida)  Hypertension  Mother ()  High blood pressure  Sister (Guadalupe)  Sister (Nereida)  Congestive heart failure  Mother ()  Comments:  2012 10:18 AM - Davina Santana  Cunningham's lung.  Seasonal allergy  Daughter  Son     Procedure history:    Colonoscopy (687467914) in the month of 10/2014 at 73 Years.  Comments:  2016 12:58 PM - Chelo Buchanan CMA  few diverticula, otherwise normal  Upper GI endoscopy (9117702500) in the month of  10/2014 at 73 Years.  Bone density scan (667526664) in the month of 2009 at 68 Years.  Cataract extraction (75387785) in  at 66 Years.  Comments:  2010 1:30 PM - Angelique Singleton 2006. R. 2007  Colonoscopy (580735577) on 10/6/2005 at 64 Years.  Comments:  2012 10:03 AM - Dewayne SCHILLING Chelo  Normal - no polyps/no masses  Excision left cheek. on 2005 at 64 Years.  excision of thyroid nodule in the month of 2001 at 60 Years.  Vaginal hysterectomy (926832945) in the month of 1998 at 58 Years.  Cholecystectomy (59517512) in  at 42 Years.  Childbirth (0635269812).  Comments:  2010 1:31 PM - Angelique Singleton   3, Para 3  Blepharoplasty of upper eyelid (44376956).  Comments:  2012 1:19 PM - Gina Padilla  Ptosis  Hysterectomy (217146207).  Comments:  2014 3:37 PM - Davina Santana  With one ovary remaining.   Social History:        Alcohol Assessment            Couple times a year.      Tobacco Assessment            Never      Substance Abuse Assessment            Never      Employment and Education Assessment            Work/School description: teacher.            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      Home and Environment Assessment            Marital status: .  Spouse/Partner name: Himanshu.      Nutrition and Health Assessment            Type of diet: Regular.      Exercise and Physical Activity Assessment            Exercise frequency: 8 times per month.      Other Assessment            Marital status,         Physical Examination   vital signs stable, as noted above   Vital Signs   2017 1:15 PM CST Temperature Tympanic 98.0 DegF    Peripheral Pulse Rate 61 bpm    HR Method Electronic    Respiratory Rate 14 br/min    Systolic Blood Pressure 144 mmHg  HI    Diastolic Blood Pressure 80 mmHg    Mean Arterial Pressure 101 mmHg    BP Site Right arm    BP Method Manual    Oxygen Saturation 97 %      Measurements from  flowsheet : Measurements   2/14/2017 1:15 PM CST Height Measured - Standard 64.25 in    Weight Measured - Standard 161 lb    BSA 1.82 m2    Body Mass Index 27.42 kg/m2      General:  Alert and oriented, No acute distress.    Eye:  Extraocular movements are intact.    HENT:  Normocephalic.    Neck:  Supple.    Respiratory:  Lungs are clear to auscultation, Respirations are non-labored.    Cardiovascular:  Normal rate, Regular rhythm, No murmur, No edema.    Gastrointestinal:  Soft, Non-tender, Normal bowel sounds.    Genitourinary:  No costovertebral angle tenderness.    Musculoskeletal:  Normal range of motion, Normal strength, No tenderness, Healing vertical incision at right wrist with 5 purse-string sutures.    Neurologic:  Alert, Oriented, Normal motor function, No focal deficits.    Cognition and Speech:  Oriented, Speech clear and coherent.    Psychiatric:  Appropriate mood & affect.       Review / Management   Results review:  Lab results   2/14/2017 1:09 PM CST UA Color Yellow    UA Clarity Slightly Cloudy    UA pH 6.0    UA Specific Gravity <=1.005    UA Glucose Negative mg/dL    UA Bilirubin Negative    UA Ketones Negative mg/dL    U Occult Blood Trace    UA Protein Negative mg/dL    UA Nitrite Positive    UA Leuk Est Large    UA Urobilinogen Normal    UA WBC >100    UA RBC 6-10    UA Bacteria Many    UA Epithelial Cells Few   .       Impression and Plan   Diagnosis     Iron deficiency anemia (XBJ08-FQ D50.0).     Esophageal Reflux Disease (VOU51-YB K21.9).     Osteoporosis (YHG76-JU M81.0).     Encounter for removal of sutures (ANB05-QZ Z48.02).     Cystitis without hematuria (OEC79-XB N30.90).         .) 5 purse string sutures removed with ease.  No retained fragments    .) UTI   - U/A consistent with pyuria; processing urine Cx   - start empirically on Macrobid 100mg BID x 5 days    plan as previously outlined:     .) GERD, esophagitis   - grade C; EGD in '14 ([facility])   - pathology showing no  evidence of Barretts esophagus   - maintained on omeprazole 20mg daily    .) h/o severe iron deficiency anemia   - Hgb 6.2 ('14) - related to esophagitis   - Hgb normalized; last measured 13.4 (4/2016)   - normal iron stores and CBC (9/2016)   - normal colonocopy in '14    .) hypertension   - blood pressure trending high based on in-clinic blood pressures for some time   - will discuss in more detail at future visit; anticipate starting on antihypertensive medications    .) health maintenance   - osteoporosis based on '16 DEXA    - discuss potential medication therapy at next visit   - maintain on calcium/vitamin D supplement   - mixed hyperlipidemia; high HDL and LDL    RTC in 6 months; CBC

## 2022-02-16 NOTE — NURSING NOTE
Vital Signs Entered On:  12/22/2021 1:11 PM CST    Performed On:  12/22/2021 1:11 PM CST by Chelo Buchanan CMA               Vital Signs   Systolic Blood Pressure :   205 mmHg (HI)    Diastolic Blood Pressure :   82 mmHg (HI)    Mean Arterial Pressure :   123 mmHg   Chelo Buchanan CMA - 12/22/2021 1:11 PM CST

## 2022-02-16 NOTE — NURSING NOTE
Medicare Visit Entered On:  12/3/2020 2:00 PM CST    Performed On:  12/3/2020 1:59 PM CST by Chelo Buchanan CMA               Summary   Weight Measured :   161 lb(Converted to: 161 lb 0 oz, 73.028 kg)    Height Measured :   64.5 in(Converted to: 5 ft 4 in, 163.83 cm)    Body Mass Index :   27.21 kg/m2 (HI)    Body Surface Area :   1.82 m2   Systolic Blood Pressure :   170 mmHg (HI)    Diastolic Blood Pressure :   72 mmHg   Mean Arterial Pressure :   105 mmHg   Peripheral Pulse Rate :   64 bpm   Temperature Tympanic :   96.5 DegF(Converted to: 35.8 DegC)  (LOW)    Oxygen Saturation :   95 %   Chelo Buchanan CMA - 12/3/2020 2:07 PM CST   Chief Complaint :   AWV   Advance Directive :   Yes   Height/Length Estimated :   64.5 in(Converted to: 5 ft 4 in, 163.83 cm)    Chelo Buchanan CMA - 12/3/2020 1:59 PM CST   Health Status   Allergies Verified? :   Yes   Medication History Verified? :   Yes   Immunizations Current :   Yes   Medical History Verified? :   Yes   Pre-Visit Planning Status :   Completed   Tobacco Use? :   Never smoker   Chelo Buchanan CMA - 12/3/2020 1:59 PM CST   Consents   Consent for Immunization Exchange :   Consent Granted   Consent for Immunizations to Providers :   Consent Granted   Chelo Buchanan CMA - 12/3/2020 1:59 PM CST   Social History   Social History   (As Of: 12/3/2020 2:00:32 PM CST)   Alcohol:        Past   (Last Updated: 9/15/2017 9:34:39 AM CDT by Ines Iyer)          Tobacco:        Never (less than 100 in lifetime)   (Last Updated: 12/3/2020 1:59:34 PM CST by Chelo Buchanan CMA)          Electronic Cigarette/Vaping:        Electronic Cigarette Use: Never.   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   (Last Updated: 12/19/2012 1:22:59 PM CST by Gina Padilla)    "Work/School description: teacher.   (Last Updated: 12/10/2010 10:19:59 AM CST by Alejandro Wheeler MD)          Home/Environment:        Marital status: .  Spouse/Partner name: Himanshu.   (Last Updated: 12/21/2012 10:58:31 AM CST by Chelo Buchanan CMA)          Nutrition/Health:        Type of diet: Regular.   (Last Updated: 12/31/2013 3:33:43 PM CST by Ines Ramos MA)          Exercise:         Comments:  9/14/2017 8:52 AM - Chelo Gonzales: Walking, biking, \"some leg and back weights\".  Five times per week.   (Last Updated: 9/14/2017 8:52:23 AM CDT by Chelo Gonzales)          Other:        Marital status,    (Last Updated: 12/8/2010 1:34:08 PM CST by Angelique Singleton)            Geriatric Depression Screening   Geriatric Depression Satisfied Life :   Yes   Geriatric Depression Dropped Activities :   Yes   Geriatric Depression Life Empty :   No   Geriatric Depression Bored :   No   Geriatric Depression Good Spirits :   Yes   Geriatric Depression Afraid Bad Things :   No   Geriatric Depression Feel Happy :   Yes   Geriatric Depression Feel Helpless :   No   Geriatric Depression Prefer to Stay Home :   No   Geriatric Depression Memory Problems :   No   Geriatric Depression Wonderful Be Alive :   Yes   Geriatric Depression Feel Worthless :   No   Geriatric Depression Situation Hopeless :   No   Geriatric Depression Others Better Off :   No   Geriatric Depression Full of Energy :   Yes   Geriatric Depression Total Score :   1    Luann Buchanan CMAe - 12/3/2020 1:59 PM CST   Hearing and Vision Screening   Audiogram Result Right Ear :   Fail   Audiogram Result Left Ear :   Fail   Hearing Screen Comments :   heard only at 1000hz - declines add'l testing   Dewayne SCHILLING Chelo - 12/3/2020 2:11 PM CST   Advance Directives   Advance Directive :   Yes   Advance Directive Type :   Medical durable power of    Dewayne SCHILLING Chelo - 12/3/2020 1:59 PM CST   Km Fall Risk   History of Falls in Last 3 Months Km :   No "   Chelo Buchanan CMA - 12/3/2020 1:59 PM CST   Functional Assessment   Focused Functional Assessment Grid   Bathing :   Independent (2)   Dressing :   Independent (2)   Toileting :   Independent (2)   Transferring Bed or Chair :   Independent (2)   Feeding :   Independent (2)   Luann Buchanan CMAe - 12/3/2020 1:59 PM CST   Capable of Shopping :   Yes   Capable of Walking :   Yes   Capable of Housekeeping :   Yes   Capable of Managing Medications :   Yes   Capable of Handling Finances :   Yes   Chelo Buchanan CMA - 12/3/2020 1:59 PM CST

## 2022-02-27 ENCOUNTER — NURSE TRIAGE (OUTPATIENT)
Dept: NURSING | Facility: CLINIC | Age: 82
End: 2022-02-27
Payer: COMMERCIAL

## 2022-02-27 NOTE — TELEPHONE ENCOUNTER
"Patient has diarrhea and cramps -started Thursday morning    Eating BRAT diet. Is not drinking as \"fluids cause more diarrhea\". Has not taken Immodium. Does not think she has urinated since yesterday, but is not lightheaded or dizzy.    Brown, watery stool - continuous  No fever, no vomiting  Some abdominal cramping    Not diabetic  No shortness of breath or breathing difficulty    Per protocol, recommendations are for patient to Go to ED Now. Patient strongly does not want to go in as she cannot control her bowels at all.    Advised patient to avoid food and  increase fluid intake including electrolyte drinks as well as taking Immodium.  Strongly encouraged ED for IV hydration and lab work. . Patient verbalizes understanding and agrees with plan of care.     Юлия Muhammad RN  02/27/22 8:53 AM  [facility] Nurse Advisor    Reason for Disposition    [1] Drinking very little AND [2] dehydration suspected (e.g., no urine > 12 hours, very dry mouth, very lightheaded)    Additional Information    Negative: Shock suspected (e.g., cold/pale/clammy skin, too weak to stand, low BP, rapid pulse)    Negative: Difficult to awaken or acting confused (e.g., disoriented, slurred speech)    Negative: Sounds like a life-threatening emergency to the triager    Negative: Vomiting also present and worse than the diarrhea    Negative: [1] Blood in stool AND [2] without diarrhea    Negative: Diarrhea in a cancer patient who is currently (or recently) receiving chemotherapy or radiation therapy, or cancer patient who has metastatic or end-stage cancer and is receiving palliative care    Negative: [1] SEVERE abdominal pain (e.g., excruciating) AND [2] present > 1 hour    Negative: [1] SEVERE abdominal pain AND [2] age > 60    Negative: [1] Blood in the stool AND [2] moderate or large amount of blood    Negative: Black or tarry bowel movements  (Exception: chronic-unchanged  black-grey bowel movements AND is taking iron pills or " Pepto-bismol)    Protocols used: DIARRHEA-A-AH    COVID 19 Nurse Triage Plan/Patient Instructions    Please be aware that novel coronavirus (COVID-19) may be circulating in the community. If you develop symptoms such as fever, cough, or SOB or if you have concerns about the presence of another infection including coronavirus (COVID-19), please contact your health care provider or visit [email or web address].     Disposition/Instructions    ED Visit recommended. Follow protocol based instructions.     Bring Your Own Device:  Please also bring your smart device(s) (smart phones, tablets, laptops) and their charging cables for your personal use and to communicate with your care team during your visit.    Thank you for taking steps to prevent the spread of this virus.  o Limit your contact with others.  o Wear a simple mask to cover your cough.  o Wash your hands well and often.    Resources    [facility]: About COVID-19: [email or web address]    CDC: What to Do If You're Sick: www.cdc.gov/coronavirus/2019-ncov/about/steps-when-sick.html    CDC: Ending Home Isolation: www.cdc.gov/coronavirus/2019-ncov/hcp/disposition-in-home-patients.html     CDC: Caring for Someone: www.cdc.gov/coronavirus/2019-ncov/if-you-are-sick/care-for-someone.html     [facility]: Interim Guidance for Hospital Discharge to Home: www.[email or web address].[facility].[place]/diseases/coronavirus/hcp/hospdischarge.pdf    [facility] clinical trials (COVID-19 research studies): [email or web address]-clinical-trials     Below are the COVID-19 hotlines at the [place] [facility] ([facility]). Interpreters are available.   o For health questions: Call 601-453-0189 or 1-803.966.5805 (7 a.m. to 7 p.m.)  o For questions about schools and childcare: Call 463-447-1962 or 1-576.125.3972 (7 a.m. to 7 p.m.)

## 2022-02-28 ENCOUNTER — OFFICE VISIT (OUTPATIENT)
Dept: FAMILY MEDICINE | Facility: CLINIC | Age: 82
End: 2022-02-28
Payer: COMMERCIAL

## 2022-02-28 VITALS
BODY MASS INDEX: 27.12 KG/M2 | WEIGHT: 158 LBS | SYSTOLIC BLOOD PRESSURE: 110 MMHG | RESPIRATION RATE: 20 BRPM | TEMPERATURE: 97.3 F | HEART RATE: 88 BPM | DIASTOLIC BLOOD PRESSURE: 64 MMHG

## 2022-02-28 DIAGNOSIS — K52.9 GASTROENTERITIS: Primary | ICD-10-CM

## 2022-02-28 PROBLEM — D50.9 IRON DEFICIENCY ANEMIA: Status: ACTIVE | Noted: 2022-02-28

## 2022-02-28 PROBLEM — E66.9 OBESITY: Status: ACTIVE | Noted: 2022-02-28

## 2022-02-28 PROBLEM — I10 HYPERTENSION: Status: ACTIVE | Noted: 2022-02-28

## 2022-02-28 PROBLEM — E78.00 PURE HYPERCHOLESTEROLEMIA: Status: ACTIVE | Noted: 2022-02-28

## 2022-02-28 PROBLEM — K21.9 GASTROESOPHAGEAL REFLUX DISEASE: Status: ACTIVE | Noted: 2022-02-28

## 2022-02-28 PROBLEM — M19.90 OSTEOARTHRITIS: Status: ACTIVE | Noted: 2022-02-28

## 2022-02-28 PROBLEM — Z86.19 HISTORY OF VARICELLA: Status: ACTIVE | Noted: 2022-02-28

## 2022-02-28 LAB
ANION GAP SERPL CALCULATED.3IONS-SCNC: 5 MMOL/L (ref 3–14)
BUN SERPL-MCNC: 13 MG/DL (ref 7–30)
CALCIUM SERPL-MCNC: 8 MG/DL (ref 8.5–10.1)
CHLORIDE BLD-SCNC: 109 MMOL/L (ref 94–109)
CO2 SERPL-SCNC: 22 MMOL/L (ref 20–32)
CREAT SERPL-MCNC: 1.04 MG/DL (ref 0.52–1.04)
ERYTHROCYTE [DISTWIDTH] IN BLOOD BY AUTOMATED COUNT: 13.7 % (ref 10–15)
GFR SERPL CREATININE-BSD FRML MDRD: 53 ML/MIN/1.73M2
GLUCOSE BLD-MCNC: 102 MG/DL (ref 70–99)
HCT VFR BLD AUTO: 37.5 % (ref 35–47)
HGB BLD-MCNC: 12.2 G/DL (ref 11.7–15.7)
MCH RBC QN AUTO: 30.4 PG (ref 26.5–33)
MCHC RBC AUTO-ENTMCNC: 32.5 G/DL (ref 31.5–36.5)
MCV RBC AUTO: 94 FL (ref 78–100)
PLATELET # BLD AUTO: 303 10E3/UL (ref 150–450)
POTASSIUM BLD-SCNC: 4.1 MMOL/L (ref 3.4–5.3)
RBC # BLD AUTO: 4.01 10E6/UL (ref 3.8–5.2)
SODIUM SERPL-SCNC: 136 MMOL/L (ref 133–144)
WBC # BLD AUTO: 6.7 10E3/UL (ref 4–11)

## 2022-02-28 PROCEDURE — 80048 BASIC METABOLIC PNL TOTAL CA: CPT | Performed by: PHYSICIAN ASSISTANT

## 2022-02-28 PROCEDURE — 85027 COMPLETE CBC AUTOMATED: CPT | Performed by: PHYSICIAN ASSISTANT

## 2022-02-28 PROCEDURE — 36415 COLL VENOUS BLD VENIPUNCTURE: CPT | Performed by: PHYSICIAN ASSISTANT

## 2022-02-28 PROCEDURE — 99213 OFFICE O/P EST LOW 20 MIN: CPT | Performed by: PHYSICIAN ASSISTANT

## 2022-02-28 RX ORDER — NICOTINE POLACRILEX 4 MG/1
20 GUM, CHEWING ORAL DAILY
COMMUNITY
End: 2022-07-18

## 2022-02-28 RX ORDER — ATORVASTATIN CALCIUM 20 MG/1
20 TABLET, FILM COATED ORAL DAILY
COMMUNITY
Start: 2021-12-13 | End: 2022-10-20

## 2022-02-28 RX ORDER — DIPHENOXYLATE HCL/ATROPINE 2.5-.025MG
1 TABLET ORAL 4 TIMES DAILY PRN
Qty: 20 TABLET | Refills: 0 | Status: SHIPPED | OUTPATIENT
Start: 2022-02-28 | End: 2024-01-02

## 2022-02-28 RX ORDER — ATENOLOL AND CHLORTHALIDONE TABLET 50; 25 MG/1; MG/1
1 TABLET ORAL DAILY
COMMUNITY
Start: 2021-12-22 | End: 2023-01-02

## 2022-02-28 ASSESSMENT — ENCOUNTER SYMPTOMS
VOMITING: 0
NAUSEA: 0
DIARRHEA: 1

## 2022-02-28 NOTE — PROGRESS NOTES
"  Assessment & Plan     Gastroenteritis    - Basic metabolic panel; Future  - CBC with platelets; Future  - diphenoxylate-atropine (LOMOTIL) 2.5-0.025 MG tablet; Take 1 tablet by mouth 4 times daily as needed for diarrhea             BMI:   Estimated body mass index is 27.12 kg/m  as calculated from the following:    Height as of 12/22/21: 1.626 m (5' 4\").    Weight as of this encounter: 71.7 kg (158 lb).           Return in about 3 days (around 3/3/2022), or if symptoms worsen or fail to improve.    ASHELY Galicia [facility]    Alejandro Rothman is a 81 year old who presents for the following health issues  accompanied by her self.    HPI     Diarrhea  Onset/Duration: 5 days  Description:       Consistency of stool: watery       Blood in stool: no       Number of loose stools past 24 hours: 10 plus times daily  Progression of Symptoms: worsening  Accompanying signs and symptoms:       Fever: no       Nausea/Vomiting: no       Abdominal pain: YES       Weight loss: not applicable       Episodes of constipation: not applicable  History   Ill contacts: not applicable  Recent use of antibiotics: no  Recent travels: YES  Recent medication-new or changes(Rx or OTC): YES  Precipitating or alleviating factors: None  Therapies tried and outcome: Imodium AD    Returned from [place] 5 days ago, sxs started upon returned  Multiple loose stools daily, no blood  Has Imodium for 3 days without response    Review of Systems   Gastrointestinal: Positive for diarrhea. Negative for nausea and vomiting.   All other systems reviewed and are negative.           Objective    /64 (BP Location: Right arm, Patient Position: Sitting, Cuff Size: Adult Regular)   Pulse 88   Temp 97.3  F (36.3  C)   Resp 20   Wt 71.7 kg (158 lb)   BMI 27.12 kg/m    Body mass index is 27.12 kg/m .  Physical Exam  Cardiovascular:      Rate and Rhythm: Normal rate and regular rhythm.   Pulmonary:      Effort: " Pulmonary effort is normal.      Breath sounds: Normal breath sounds.   Abdominal:      General: Abdomen is flat. Bowel sounds are normal.      Palpations: Abdomen is soft.   Neurological:      Mental Status: She is alert.

## 2022-02-28 NOTE — PATIENT INSTRUCTIONS
Start on Lomotil (up to 4 times daily) for diarrhea  Push fluids, continue with BRAT diet  Expect some improvement within 72 hrs, follow up if symptoms are continuing  Patient Education     Noninfectious Gastroenteritis (Adult)    Gastroenteritis can cause nausea, vomiting, diarrhea, and cramping in the belly. This may occur from food sensitivity, inflammation of your gastrointestinal tract, medicines, stress, or other causes not related to infection. Your symptoms will usually last from 1 to 3 days, but can last longer. Antibiotics are not effective, but simple home treatment will be helpful.  Home care  Medicine    You may use acetaminophen or NSAID medicines like ibuprofen or naproxen to control fever, unless another medicine is prescribed. (Note: If you have chronic liver or kidney disease, or ever had a stomach ulcer or gastrointestinaI bleeding, talk with your healthcare provider before using these medicines.) Aspirin should never be used in anyone under 18 years of age who is ill with a fever. It may cause severe liver damage. Don't increase your NSAID medicines if you are already taking these medicines for another condition (like arthritis). Don't use NSAIDS if you are on aspirin (such as for heart disease, or after a stroke).    If medicines for diarrhea or vomiting are prescribed, take only as directed.  General care and preventing spread of the illness    If symptoms are severe, rest at home for the next 24 hours or until you feel better.    Hand washing with soap and water is the best way to prevent the spread of infection. Wash your hands after touching anyone who is sick.    Wash your hands after using the toilet and before meals. Clean the toilet after each use.    Caffeine, tobacco, and alcohol can make your diarrhea, cramping, and pain worse.  Diet    Water and clear liquids are important so you do not get dehydrated. Drink a small amount at a time.    Don't force yourself to eat, especially if you  have cramps, vomiting, or diarrhea. When you finally decide to start eating, do not eat large amounts at a time, even if you are hungry.    If you eat, avoid fatty, greasy, spicy, or fried foods.    Don't eat dairy products if you have diarrhea; they can make the diarrhea worse.  During the first 24 hours (the first full day), follow the diet below:    Beverages: Water, clear liquids, soft drinks without caffeine, like ginger ale; mineral water (plain or flavored); decaffeinated tea and coffee.    Soups: Clear broth, consommé, and bouillon sports drinks aren't a good choice because they have too much sugar and not enough electrolytes. In this case, commercially available products called oral rehydration solutions are best.    Desserts: Plain gelatin, ice pops, and fruit juice bars  During the next 24 hours (the second day), you may add the following to the above if you have improved. If not, continue what you did the first day:    Hot cereal, plain toast, bread, rolls, crackers    Plain noodles, rice, mashed potatoes, chicken noodle or rice soup    Unsweetened canned fruit and bananas (don't eat pineapple or citrus)    Limit caffeine and chocolate. No spices or seasonings except salt.  During the next 24 hours    Gradually resume a normal diet, as you feel better and your symptoms improve.    If at any time your symptoms start getting worse, go back to clear liquids until you feel better.    Food preparation    If you have diarrhea, you should not prepare food for others. When you  prepare food for yourself, wash your hands before and after.    Wash your hands after using cutting boards, countertops, and knives that have been in contact with raw food.    Keep uncooked meats away from cooked and ready-to-eat foods.    Follow-up care  Follow up with your healthcare provider if you are not improving over the next 2 to 3 days, or as advised. If a stool (diarrhea) sample was taken, call for the results as directed.  Call  911  Call 911 if any of these occur:    Trouble breathing    Chest pain    Confusion    Severe drowsiness or trouble awakening    Seizure    Stiff neck  When to seek medical advice  Call your healthcare provider right away if any of these occur:     Increasing belly pain or constant lower right belly pain    Continued vomiting (unable to keep liquids down)    Frequent diarrhea (more than 5 times a day)    Blood in vomit or stool (black or red color)    Inability to tolerate solid food after a few days.    Dark urine, reduced urine output    Weakness, dizziness    Drowsiness    Fever of 100.4 F (38.0 C) or higher, or as directed by your healthcare provider    New rash  [employer] last reviewed this educational content on 3/1/2018    4661-1842 [employer]. All rights reserved. This information is not intended as a substitute for professional medical care. Always follow your healthcare professional's instructions.

## 2022-02-28 NOTE — LETTER
February 28, 2022      Lorna Muse  [address]   [facility] [id number]        Dear ,    We are writing to inform you of your test results.    Your test results fall within the expected range(s) or remain unchanged from previous results.  Please continue with current treatment plan.  Please follow up if your symptoms are not improving    Resulted Orders   Basic metabolic panel   Result Value Ref Range    Sodium 136 133 - 144 mmol/L    Potassium 4.1 3.4 - 5.3 mmol/L    Chloride 109 94 - 109 mmol/L    Carbon Dioxide (CO2) 22 20 - 32 mmol/L    Anion Gap 5 3 - 14 mmol/L    Urea Nitrogen 13 7 - 30 mg/dL    Creatinine 1.04 0.52 - 1.04 mg/dL    Calcium 8.0 (L) 8.5 - 10.1 mg/dL    Glucose 102 (H) 70 - 99 mg/dL    GFR Estimate 53 (L) >60 mL/min/1.73m2      Comment:      Effective December 21, 2021 eGFRcr in adults is calculated using the 2021 CKD-EPI creatinine equation which includes age and gender (Juanjose et al., NEJ, DOI: 10.1056/HRZGhv2959299)   CBC with platelets   Result Value Ref Range    WBC Count 6.7 4.0 - 11.0 10e3/uL    RBC Count 4.01 3.80 - 5.20 10e6/uL    Hemoglobin 12.2 11.7 - 15.7 g/dL    Hematocrit 37.5 35.0 - 47.0 %    MCV 94 78 - 100 fL    MCH 30.4 26.5 - 33.0 pg    MCHC 32.5 31.5 - 36.5 g/dL    RDW 13.7 10.0 - 15.0 %    Platelet Count 303 150 - 450 10e3/uL       If you have any questions or concerns, please call the clinic at the number listed above.       Sincerely,      Himanshu Wynne PA-C

## 2022-03-02 VITALS
HEART RATE: 71 BPM | BODY MASS INDEX: 27.81 KG/M2 | DIASTOLIC BLOOD PRESSURE: 74 MMHG | SYSTOLIC BLOOD PRESSURE: 189 MMHG | WEIGHT: 162 LBS

## 2022-03-02 NOTE — PROGRESS NOTES
Patient:   SURJIT BOURNE            MRN: [id number]            FIN: [id number]               Age:   81 years     Sex:  Female     :  1940   Associated Diagnoses:   Iron deficiency anemia; Esophageal Reflux Disease; Osteoporosis; Hypertension   Author:   Mg Aguiar MD      Visit Information      Date of Service: 2022 02:15 pm  Performing Location: [facility]  Encounter#: [id number]      Primary Care Provider (PCP):  Mg Aguiar MD    NPI# [id number]      Referring Provider:  Mg Aguiar MD    NPI# [id number]      Chief Complaint   2022 2:18 PM CST    HTN            Additional Information:No additional information recorded during visit.   Chief complaint and symptoms as noted above and confirmed with patient.  Recent lab and diagnostic studies reviewed with patient      History of Present Illness     12/3/2020: Surjit presents for annual wellness evaluation.  She and Rohan are doing well.  They are not planning on traveling to [place] this year because of the pandemic.  She and her family have been very careful with isolation.  She has been wearing a mask in all indoor occasions.  She and Himanshu have been healthy.  Blood pressure remains consistently high.  She is hesitant about introduction of medications though after further conversation is willing to trial further medicine.  She did not receive Reclast this year though would be willing to do it now.  21:Jo Ann returns to clinic for blood pressure follow-up.  At her last visit was started on losartan 50 mg daily due to persistent uncontrolled hypertension.  She has tolerated the medications without any real issues.  She does monitor her home blood pressures.  States before taking the medication, and her home systolic blood pressures were generally in the 150s.  She brings in a log showing home blood pressures now primarily in the 120s and 130s.  Heart rates per home recordings generally in the 60s.  Some concern with  automated blood pressure cuff reporting a pulse rate of 35.  Complaints of persistent cough, questions of its food related.    12/22/2021: Lorna returns to clinic for hypertension follow-up.  After our last visit in late December she was started on atenolol chlorthalidone combination in addition to her losartan due to accelerated blood pressures in clinic as well as in the ER presentation for suspected symptomatic hypertension.  Previously had been using a home radial blood pressure cuff that did not correlate well with home readings.  Subsequently has purchased an Omron brachiocephalic cuff which she brings in today which correlating well with our in clinic measurements.  She brings in a home log of blood pressures with systolic pressures more consistently in the 120s and 130s.  Previously stopped her chlorthalidone atenolol at my direction 1 week ago due to associated fatigue and bradycardia with heart rates in the low 40s.    1/19/2022: Lorna returns to clinic for hypertension follow-up.  After our last visit in late December she was started on atenolol chlorthalidone combination in addition to her losartan due to accelerated blood pressures in clinic as well as in the ER presentation for suspected symptomatic hypertension.  Previously had been using a home radial blood pressure cuff that did not correlate well with home readings.  Subsequently has purchased an Omron brachiocephalic cuff which she brings in today which correlating well with our in clinic measurements.  She brings in a home log of blood pressures with systolic pressures more consistently in the 120s and 130s.  Previously stopped her chlorthalidone atenolol at my direction 1 week ago due to associated fatigue and bradycardia with heart rates in the low 40s.         Review of Systems   Constitutional:  Fatigue, Decreased activity, No fever, No chills, No weakness.    Eye:  Negative except as documented in history of present illness.     Ear/Nose/Mouth/Throat:  No nasal congestion.    Respiratory:  No shortness of breath, No cough.    Cardiovascular:  Bradycardia, No chest pain, No palpitations, No peripheral edema, No syncope.    Gastrointestinal:  No nausea, No vomiting, No abdominal pain.    Genitourinary:  No dysuria, No hematuria.    Hematology/Lymphatics:  Negative except as documented in history of present illness.    Endocrine   Immunologic   Musculoskeletal:  No joint pain, No muscle pain.    Integumentary   Neurologic:  Alert and oriented X4, Abnormal balance, No confusion, No numbness, No tingling, No headache.       Health Status   Allergies:    Allergic Reactions (Selected)  Severity Not Documented  Acetaminophen (Nausea and vomiting)  Aciphex (Upset stomach)  Codeine (Vomit)  HYDROcodone (Nausea and vomiting)  TraMADol (Vomit)   Medications:  (Selected)   Prescriptions  Prescribed  atorvastatin 20 mg oral tablet: = 1 tab(s), Oral, daily, # 90 tab(s), 2 Refill(s), Type: Maintenance, Pharmacy: [facility], Pt due for annual med check for further refills., 1 tab(s) Oral daily, 64, in, 09/10/21 11:20:00 CDT, Height Measured, 159, lb, 09/10/21 11:20:00 CDT,...  calcium carbonate 500 mg (200 mg elemental calcium) oral tablet, chewable: = 1 tab(s) ( 500 mg ), Chewed, bid, # 120 tab(s), 0 Refill(s), Type: Maintenance, OTC (Rx)  losartan 50 mg oral tablet: = 1 tab(s) ( 50 mg ), Oral, daily, # 90 tab(s), 2 Refill(s), Type: Maintenance, Pharmacy: [facility], 1 tab(s) Oral daily, 64, in, 09/10/21 11:20:00 CDT, Height Measured, 159, lb, 09/10/21 11:20:00 CDT, Weight Measured  omeprazole 20 mg oral delayed release capsule: = 1 cap(s) ( 20 mg ), Oral, daily, # 90 cap(s), 2 Refill(s), Type: Maintenance, Pharmacy: [facility], keep on file and pt will notify when needed, 1 cap(s) Oral daily, 64, in, 09/10/21 11:20:00 CDT, Height Measured, 159, lb, 09/10/21 11:20:00...  Documented Medications  Documented  Reclast 5 mg/100 mL  intravenous solution: ( 5 mg ), IV, once, Instructions: annually, 0 Refill(s), Type: Maintenance  ibuprofen: po, prn, PRN: as needed for pain, 0 Refill(s), Type: Maintenance,    Medications          *denotes recorded medication          atorvastatin 20 mg oral tablet: 1 tab(s), Oral, daily, 90 tab(s), 2 Refill(s).          calcium carbonate 500 mg (200 mg elemental calcium) oral tablet, chewable: 500 mg, 1 tab(s), Chewed, bid, 120 tab(s), 0 Refill(s).          *ibuprofen: po, prn, PRN: as needed for pain, 0 Refill(s).          losartan 50 mg oral tablet: 50 mg, 1 tab(s), Oral, daily, 90 tab(s), 2 Refill(s).          omeprazole 20 mg oral delayed release capsule: 20 mg, 1 cap(s), Oral, daily, 90 cap(s), 2 Refill(s).          *Reclast 5 mg/100 mL intravenous solution: 5 mg, IV, once, annually, 0 Refill(s).       Problem list:    All Problems  Blood pressure elevated / SNOMED CT 593001366 / Confirmed  Esophageal reflux disease / SNOMED CT 877117121 / Confirmed  History of chickenpox / SNOMED CT 991431438 / Confirmed  Hypertension / SNOMED CT 2281535006 / Confirmed  Iron deficiency anemia / SNOMED CT 336385954 / Confirmed  Obesity / SNOMED CT 5896146612 / Probable  OA (osteoarthritis) / SNOMED CT 0931246989 / Confirmed  Osteoporosis / SNOMED CT 288362026 / Confirmed  Pure hypercholesterolemia / SNOMED CT 802159687 / Confirmed  Inactive: Thyroid nodule / SNOMED CT 562184497  Resolved: Acute lower UTI (urinary tract infection) / SNOMED CT 6859179180  Resolved: Bilateral carpal tunnel syndrome / SNOMED CT 32251746  Resolved: Head trauma / SNOMED CT 256557445  Resolved: Pregnancy / SNOMED CT 806581383  Resolved: Pregnancy / SNOMED CT 132508901  Resolved: Pregnancy / SNOMED CT 652356819  Resolved: Seizures / SNOMED CT 010429068  Canceled: Obesity / ICD-9-.00      Histories   Past Medical History:    Active  OA (osteoarthritis) (4264974995): Onset in 2006 at 66 years.  Comments:  12/19/2012 CST 1:20 PM DENICE - Valerie  Gina  Right knee  Esophageal reflux disease (049183633)  Obesity (7150432348)  Blood pressure elevated (854271938)  Iron deficiency anemia (233126894)  History of chickenpox (041866483)  Resolved  Seizures (778012955): Onset in  at 20 years.  Resolved.  Comments:  2012 CST 10:13 AM DENICE BernsteinDavina pena  Of unknown origin.  Acute lower UTI (urinary tract infection) (2608875793):  Resolved.  Bilateral carpal tunnel syndrome (94870973):  Resolved.  Head trauma (005885122):  Resolved.  Comments:  2014 CST 3:38 PM DENICE Santana Davina  Fell back on ice with head wound.  No sequelae.  Pregnancy (865837039):  Resolved in  at 26 years.  Pregnancy (450409098):  Resolved in  at 28 years.  Pregnancy (527523666):  Resolved in  at 30 years.   Family History:    Cancer  Father ()  Comments:  2012 10:08 AM Davina Mccann  Prostate cancer.    2010 1:33 PM Angelique Montemayor  Stomach, Prostate cancer  Asthma  Father ()  Breast cancer  Mother ()  Sister (Guadalupe)  Sister (Nereida)  Hypertension  Mother ()  High blood pressure  Sister (Guadalupe)  Sister (Nereida)  Arthritis  Father ()  Mother ()  Congestive heart failure  Mother ()  Comments:  2012 10:18 AM Davina Mccann  Cunningham's lung.  Seasonal allergy  Daughter  Son (Faraz)     Procedure history:    Release of trigger finger (552170862) in the month of 2020 at 79 Years.  Comments:  12/3/2020 10:43 AM CST - Chelo Buchanan CMA  left finger  Colonoscopy (670150365) in the month of 10/2014 at 73 Years.  Comments:  2016 12:58 PM CDT - Chelo Buchanan CMA  few diverticula, otherwise normal  Upper GI endoscopy (0893926541) in the month of 10/2014 at 73 Years.  Bone density scan (950632256) in the month of 2009 at 68 Years.  Cataract extraction (87198789) in  at 66 Years.  Comments:  2010 1:30 PM Angelique Montemayor 2006. R. 2007  Colonoscopy (480183117) on 10/6/2005 at  "64 Years.  Comments:  2012 10:03 AM CST - Dewayne Chelo SCHILLING  Normal - no polyps/no masses  Excision left cheek. on 2005 at 64 Years.  excision of thyroid nodule in the month of 2001 at 60 Years.  Vaginal hysterectomy (980769775) in the month of 1998 at 58 Years.  Cholecystectomy (81567562) in  at 42 Years.  Childbirth (9850210915).  Comments:  2010 1:31 PM CST - Mani Angelique   3, Para 3  Blepharoplasty of upper eyelid (13266995).  Comments:  2012 1:19 PM CST - Gina Padilla  Ptosis  Hysterectomy (245292674).  Comments:  2014 3:37 PM CST - Davina Santana  With one ovary remaining.  Release of trigger thumb (586055992).  Comments:  2017 1:04 PM CDT - Dewayne SCHILLINGChelo  right   Social History:        Electronic Cigarette/Vaping Assessment            Electronic Cigarette Use: Never.      Alcohol Assessment            Past      Tobacco Assessment            Never (less than 100 in lifetime)      Substance Abuse Assessment            Never      Employment and Education Assessment            Work/School description: teacher.            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      Home and Environment Assessment            Marital status: .  Spouse/Partner name: Himanshu.      Nutrition and Health Assessment            Type of diet: Regular.      Exercise and Physical Activity Assessment                     Comments:                      2017 - Chelo Gonzales                     Walking, biking, \"some leg and back weights\".  Five times per week.      Other Assessment            Marital status,         Physical Examination   vital signs stable, as noted above   Vital Signs   2022 2:18 PM CST Peripheral Pulse Rate 71 bpm    Systolic Blood Pressure 189 mmHg  HI    Diastolic Blood Pressure 74 mmHg    Mean Arterial Pressure 112 mmHg    BP Site Left arm    BP Method Electronic      Measurements from flowsheet : Measurements "   1/19/2022 2:18 PM CST Height/Length Estimated 64.5 in    Weight Measured - Standard 162 lb      General:  Alert and oriented, No acute distress.    Eye:  Pupils are equal, round and reactive to light, Extraocular movements are intact, Normal conjunctiva.    Respiratory:  Lungs are clear to auscultation, Respirations are non-labored, Breath sounds are equal.    Cardiovascular:  Normal rate, Regular rhythm, No murmur, regularly irregular rhythm.    Gastrointestinal:  Soft, Non-tender, Non-distended.    Neurologic:  Alert, Oriented, Normal sensory.    Cognition and Speech:  Oriented, Speech clear and coherent.    Psychiatric:  Appropriate mood & affect.       Review / Management   Results review:  Lab results   12/17/2021 3:11 PM CST WBC TR 7.57 x10^3/uL    Hgb TR 11.9 g/dL    Hct TR 36.1 %    Platelet  x10^3/uL   .       Impression and Plan   Diagnosis     Iron deficiency anemia (LIC94-NP D50.0).     Esophageal Reflux Disease (SKW81-GW K21.9).     Osteoporosis (LKI37-CX M81.0).     Hypertension (JXS92-SO I10).         .) hypertension, controlled (significant whitecoat component)  current antihypertensive regimen: losartan 50mg qhs  regimen changes: none  intolerance: amlodipine (palpitations, bradycardia), atenolol (bradycardia, fatigue)   - prior radial cuff with poor correlation with in-office readings   - subsequently obtained brachial cuff which correlates better today with in-office readings    - reviewed home readings, more consistently in 120s-130s    - question if more recent atenolol/chlorthalidone have residual impact on home bp readings?  Advised she continue to monitor with home cuff and if seeing numbers trend upward again, then may need to explore adding back chlorthalidone    plan as previously outlined and not discussed at today's visit     .) GERD, h/o esophagitis   - grade C; EGD in '14 ([facility])   - pathology showing no evidence of Barretts esophagus   - maintained on omeprazole 20mg  daily   - recommending H2 blocker for any breakthrough symptoms    .) h/o severe iron deficiency anemia - resolved   - Hgb 6.2 ('14) - related to esophagitis, normal colonoscopy in '14   - Hgb since normalized   - monitor CBC annually    .) health maintenance   - osteoporosis based on '16 DEXA     - on calcium/vitamin D daily     - last DEXA in '19     - begun on Reclast (need to assess with infusion center on number of cumulative dosing, typically treat for 3 yrs)   - no further breast or CRC screening advised   - mixed hyperlipidemia; high HDL and LDL    - previously recommended coronary calcium scoring - did not complete    - on atorvastatin - subsequent normalization of LDL

## 2022-03-02 NOTE — TELEPHONE ENCOUNTER
---------------------  From: Chelo Buchanan CMA ([employer] ([id number]))   To: Mg Aguiar MD;     Sent: 1/7/2022 10:56:06 AM CST  Subject: General Message-low HR     Pt has been on the atenolol/chlorthalidone 50/25 since 12/22 - they purchased a new BP monitor 12/29 and since then have noticed her HR has been running low.  c/o of some fatigue - please advise     12/29   135/59   43  12/30   125/69   48  1/2   133/70   47  1/3   132/75   42             127/74   42             113/71   43---------------------  From: Mg Aguiar MD   To: "[employer] ([id number] [facility] [place]);     Sent: 1/10/2022 10:17:27 AM CST  Subject: RE: General Message-low HR     I would not advise changes for now.  Continue to monitor at home and keep planned follow-up.  If heart rates remain in 40s and higher and feels well overall, then I'm comfortable.  I want her to gauge/monitor activity fatigue symptoms moving forward.  We can discuss further in clinic---------------------  From: Chelo Buchanan CMA ([employer] ([id number]))   To: Mg Aguiar MD;     Sent: 1/10/2022 12:56:31 PM CST  Subject: FW: General Message-low HR     pt contacted at 1250 .  States she is very fatigued, doesn't feel like doing anything.  Fatigued going up stairs, which is unusual for her---------------------  From: Mg Aguiar MD   To: "[employer] [id number] [facility] [place]     Sent: 1/11/2022 6:56:47 AM CST  Subject: RE: General Message-low HR     Have her stop the chlorthalidone-atenolol and monitor symptoms and bp/HR.  We can discuss further in clinic - likely with continuation of just chlorthalidonept contacted and advised, will  continue to monitor BP/HR/Sx off atenolol/chlorthalidone.  Will bring readings and monitor with her to appt.  pt expressed understanding

## 2022-03-02 NOTE — NURSING NOTE
Comprehensive Intake Entered On:  1/19/2022 2:24 PM CST    Performed On:  1/19/2022 2:18 PM CST by Rachel Peralta CMA               Summary   Chief Complaint :   HTN   Advance Directive :   Yes   Weight Measured :   162 lb(Converted to: 162 lb 0 oz, 73.482 kg)    Height/Length Estimated :   64.5 in(Converted to: 5 ft 4 in, 163.83 cm)    Systolic Blood Pressure :   189 mmHg (HI)    Diastolic Blood Pressure :   74 mmHg   Mean Arterial Pressure :   112 mmHg   Peripheral Pulse Rate :   71 bpm   BP Site :   Left arm   BP Method :   Electronic   Rachel Peralta CMA - 1/19/2022 2:18 PM CST   Health Status   Allergies Verified? :   Yes   Medication History Verified? :   Yes   Immunizations Current :   Yes   Medical History Verified? :   Yes   Pre-Visit Planning Status :   Completed   Tobacco Use? :   Never smoker   Rachel Peralta CMA - 1/19/2022 2:18 PM CST   Consents   Consent for Immunization Exchange :   Consent Granted   Consent for Immunizations to Providers :   Consent Granted   Rachel Peralta CMA - 1/19/2022 2:18 PM CST   Meds / Allergies   (As Of: 1/19/2022 2:24:33 PM CST)   Allergies (Active)   acetaminophen  Estimated Onset Date:   Unspecified ; Reactions:   nausea and vomiting ; Created By:   Davina Santana; Reaction Status:   Active ; Category:   Drug ; Substance:   acetaminophen ; Type:   Allergy ; Updated By:   Davina Santana; Reviewed Date:   1/19/2022 2:24 PM CST      Aciphex  Estimated Onset Date:   Unspecified ; Reactions:   Upset stomach ; Created By:   Gina Padilla; Reaction Status:   Active ; Category:   Drug ; Substance:   Aciphex ; Type:   Allergy ; Updated By:   Gina Padilla; Reviewed Date:   1/19/2022 2:24 PM CST      codeine  Estimated Onset Date:   Unspecified ; Reactions:   Vomit ; Created By:   Angelique Singleton; Reaction Status:   Active ; Category:   Drug ; Substance:   codeine ; Type:   Allergy ; Updated By:   Angelique Singleton; Reviewed Date:   1/19/2022 2:24 PM CST      HYDROcodone  Estimated  Onset Date:   Unspecified ; Reactions:   nausea and vomiting ; Created By:   Davina Santana; Reaction Status:   Active ; Category:   Drug ; Substance:   HYDROcodone ; Type:   Allergy ; Updated By:   Davina Santnaa; Reviewed Date:   1/19/2022 2:24 PM CST      traMADol  Estimated Onset Date:   Unspecified ; Reactions:   Vomit ; Created By:   Abhishek Song CMA; Reaction Status:   Active ; Category:   Drug ; Substance:   traMADol ; Type:   Allergy ; Updated By:   Abhishek Song CMA; Reviewed Date:   1/19/2022 2:24 PM CST        Medication List   (As Of: 1/19/2022 2:24:33 PM CST)   Prescription/Discharge Order    atenolol-chlorthalidone  :   atenolol-chlorthalidone ; Status:   Suspended ; Ordered As Mnemonic:   atenolol-chlorthalidone 50 mg-25 mg oral tablet ; Simple Display Line:   1 tab(s), Oral, daily, 90 tab(s), 3 Refill(s) ; Ordering Provider:   Mg Aguiar MD; Catalog Code:   atenolol-chlorthalidone ; Order Dt/Tm:   12/22/2021 1:25:17 PM CST          atorvastatin  :   atorvastatin ; Status:   Prescribed ; Ordered As Mnemonic:   atorvastatin 20 mg oral tablet ; Simple Display Line:   1 tab(s), Oral, daily, 90 tab(s), 2 Refill(s) ; Ordering Provider:   Mg Aguiar MD; Catalog Code:   atorvastatin ; Order Dt/Tm:   9/13/2021 12:43:25 PM CDT          calcium carbonate  :   calcium carbonate ; Status:   Prescribed ; Ordered As Mnemonic:   calcium carbonate 500 mg (200 mg elemental calcium) oral tablet, chewable ; Simple Display Line:   500 mg, 1 tab(s), Chewed, bid, 120 tab(s), 0 Refill(s) ; Ordering Provider:   Mg Aguiar MD; Catalog Code:   calcium carbonate ; Order Dt/Tm:   1/14/2021 2:16:45 PM CST          losartan  :   losartan ; Status:   Prescribed ; Ordered As Mnemonic:   losartan 50 mg oral tablet ; Simple Display Line:   50 mg, 1 tab(s), Oral, daily, 90 tab(s), 2 Refill(s) ; Ordering Provider:   Mg Aguiar MD; Catalog Code:   losartan ; Order Dt/Tm:   9/13/2021 12:44:36 PM CDT          omeprazole  :    omeprazole ; Status:   Prescribed ; Ordered As Mnemonic:   omeprazole 20 mg oral delayed release capsule ; Simple Display Line:   20 mg, 1 cap(s), Oral, daily, 90 cap(s), 2 Refill(s) ; Ordering Provider:   Mg Aguiar MD; Catalog Code:   omeprazole ; Order Dt/Tm:   9/13/2021 12:43:46 PM CDT            Home Meds    ibuprofen  :   ibuprofen ; Status:   Documented ; Ordered As Mnemonic:   ibuprofen ; Simple Display Line:   po, prn, PRN: as needed for pain, 0 Refill(s) ; Catalog Code:   ibuprofen ; Order Dt/Tm:   2/14/2017 1:21:00 PM CST          zoledronic acid  :   zoledronic acid ; Status:   Documented ; Ordered As Mnemonic:   Reclast 5 mg/100 mL intravenous solution ; Simple Display Line:   5 mg, IV, once, annually, 0 Refill(s) ; Catalog Code:   zoledronic acid ; Order Dt/Tm:   12/21/2021 11:51:25 AM CST

## 2022-04-19 ENCOUNTER — ALLIED HEALTH/NURSE VISIT (OUTPATIENT)
Dept: FAMILY MEDICINE | Facility: CLINIC | Age: 82
End: 2022-04-19
Payer: COMMERCIAL

## 2022-04-19 DIAGNOSIS — Z23 HIGH PRIORITY FOR 2019-NCOV VACCINE: Primary | ICD-10-CM

## 2022-04-19 PROCEDURE — 0064A COVID-19,PF,MODERNA (18+ YRS BOOSTER .25ML): CPT | Performed by: INTERNAL MEDICINE

## 2022-04-19 PROCEDURE — 91306 COVID-19,PF,MODERNA (18+ YRS BOOSTER .25ML): CPT | Performed by: INTERNAL MEDICINE

## 2022-06-21 ENCOUNTER — TELEPHONE (OUTPATIENT)
Dept: FAMILY MEDICINE | Facility: CLINIC | Age: 82
End: 2022-06-21
Payer: COMMERCIAL

## 2022-06-21 DIAGNOSIS — E78.00 PURE HYPERCHOLESTEROLEMIA: Primary | ICD-10-CM

## 2022-06-21 RX ORDER — ATORVASTATIN CALCIUM 20 MG/1
20 TABLET, FILM COATED ORAL DAILY
Qty: 90 TABLET | Refills: 1 | Status: SHIPPED | OUTPATIENT
Start: 2022-06-21 | End: 2022-12-30

## 2022-06-21 NOTE — TELEPHONE ENCOUNTER
Reason for Call:  Medication or medication refill:    Do you use a [facility]?  Name of the pharmacy and phone number for the current request:[facility]    Name of the medication requested: atorvasatin 20mg    Other request: Needs refill    Can we leave a detailed message on this number? YES    Phone number patient can be reached at: Home number on file [phone] (home)    Best Time: any    Call taken on 6/21/2022 at 4:37 PM by CYNTHIA TYLER

## 2022-06-21 NOTE — TELEPHONE ENCOUNTER
Pt notified refill has been sent.   Prescription approved per [facility] Refill Protocol.    Last Written Prescription Date: 9/13/21  Last Fill Quantity: 90,  # refills: 2   Last office visit: 2/28/2022 with prescribing provider   9/1/21 lipid panel completed

## 2022-06-22 ENCOUNTER — TELEPHONE (OUTPATIENT)
Dept: FAMILY MEDICINE | Facility: CLINIC | Age: 82
End: 2022-06-22

## 2022-06-22 DIAGNOSIS — L98.9 SKIN LESION: Primary | ICD-10-CM

## 2022-06-22 NOTE — TELEPHONE ENCOUNTER
Reason for Call: Request for an order or referral:    Order or referral being requested: Dermatology Referral    Date needed: as soon as possible    Has the patient been seen by the PCP for this problem? YES    Additional comments: pt would like dermatology referral sent to [facility]    Phone number Patient can be reached at:  Cell number on file:    Telephone Information:   Mobile [phone]       Best Time:  anytime    Can we leave a detailed message on this number?  YES    Call taken on 6/22/2022 at 10:12 AM by Siobhan Del Cid

## 2022-07-18 DIAGNOSIS — K21.9 GASTROESOPHAGEAL REFLUX DISEASE: Primary | ICD-10-CM

## 2022-07-18 NOTE — TELEPHONE ENCOUNTER
7-18-22  Reason for Call:medication refill:    Do you use a [facility]?    [facility] [place]    Name of the medication requested: omeprazole 20 MG tablet    Other request: none    Can we leave a detailed message on this number? YES    Phone number patient can be reached at: Cell number on file:    Telephone Information:   Mobile [phone]       Best Time: antime    Call taken on 7/18/2022 at 10:50 AM by Annalisa Ellington

## 2022-07-20 NOTE — TELEPHONE ENCOUNTER
"Routing refill request to provider for review/approval because:  Failed protocol - patient has dx of osteoporosis.    Last Written Prescription Date:  9/13/2021  Last Fill Quantity: 90,  # refills: 2   Last office visit provider:  1/19/2022     Requested Prescriptions   Pending Prescriptions Disp Refills     omeprazole 20 MG tablet 90 tablet 0     Sig: Take 1 tablet (20 mg) by mouth daily       PPI Protocol Failed - 7/18/2022 10:51 AM        Failed - No diagnosis of osteoporosis on record        Passed - Not on Clopidogrel (unless Pantoprazole ordered)        Passed - Recent (12 mo) or future (30 days) visit within the authorizing provider's specialty     Patient has had an office visit with the authorizing provider or a provider within the authorizing providers department within the previous 12 mos or has a future within next 30 days. See \"Patient Info\" tab in inbasket, or \"Choose Columns\" in Meds & Orders section of the refill encounter.              Passed - Medication is active on med list        Passed - Patient is age 18 or older        Passed - No active pregnacy on record        Passed - No positive pregnancy test in past 12 months             Lillian Collins RN 07/20/22 9:36 AM  "

## 2022-07-20 NOTE — TELEPHONE ENCOUNTER
Pt called to check on the status of the refill request she made on 07/18/2022. TC advised PT that the turn around time on refill requests are 1-3 business days. Pt needs medication as soon as possible and is requesting this to be expedited.     Zayra Starr

## 2022-07-21 RX ORDER — NICOTINE POLACRILEX 4 MG/1
20 GUM, CHEWING ORAL DAILY
Qty: 90 TABLET | Refills: 0 | Status: SHIPPED | OUTPATIENT
Start: 2022-07-21 | End: 2022-10-20

## 2022-10-14 ENCOUNTER — ALLIED HEALTH/NURSE VISIT (OUTPATIENT)
Dept: FAMILY MEDICINE | Facility: CLINIC | Age: 82
End: 2022-10-14
Payer: COMMERCIAL

## 2022-10-14 DIAGNOSIS — Z23 NEED FOR VACCINATION: Primary | ICD-10-CM

## 2022-10-14 PROCEDURE — G0008 ADMIN INFLUENZA VIRUS VAC: HCPCS | Mod: 59

## 2022-10-14 PROCEDURE — 0134A COVID-19,PF,MODERNA BIVALENT: CPT

## 2022-10-14 PROCEDURE — 99207 PR NO CHARGE NURSE ONLY: CPT

## 2022-10-14 PROCEDURE — 90662 IIV NO PRSV INCREASED AG IM: CPT

## 2022-10-14 PROCEDURE — 91313 COVID-19,PF,MODERNA BIVALENT: CPT

## 2022-10-17 DIAGNOSIS — K21.9 GASTROESOPHAGEAL REFLUX DISEASE: ICD-10-CM

## 2022-10-17 DIAGNOSIS — E78.00 PURE HYPERCHOLESTEROLEMIA: Primary | ICD-10-CM

## 2022-10-20 RX ORDER — ATORVASTATIN CALCIUM 20 MG/1
20 TABLET, FILM COATED ORAL DAILY
Qty: 90 TABLET | Refills: 0 | Status: SHIPPED | OUTPATIENT
Start: 2022-10-20 | End: 2022-12-30

## 2022-10-20 NOTE — TELEPHONE ENCOUNTER
Tc received from pt, requesting refills of atorvastatin and omeprazole. Pt stated [facility] has increased omeprazole to 20mg 2 times a day.    Last Written Prescription Date:  7/21/22  Last Fill Quantity: 90,  # refills: 0   Last office visit: 2/28/2022 with prescribing provider

## 2022-12-30 ENCOUNTER — OFFICE VISIT (OUTPATIENT)
Dept: FAMILY MEDICINE | Facility: CLINIC | Age: 82
End: 2022-12-30
Payer: COMMERCIAL

## 2022-12-30 VITALS
SYSTOLIC BLOOD PRESSURE: 209 MMHG | BODY MASS INDEX: 27.14 KG/M2 | HEIGHT: 64 IN | DIASTOLIC BLOOD PRESSURE: 90 MMHG | HEART RATE: 96 BPM | WEIGHT: 159 LBS

## 2022-12-30 DIAGNOSIS — Z00.00 HEALTH CARE MAINTENANCE: Primary | ICD-10-CM

## 2022-12-30 DIAGNOSIS — D50.9 IRON DEFICIENCY ANEMIA, UNSPECIFIED IRON DEFICIENCY ANEMIA TYPE: ICD-10-CM

## 2022-12-30 DIAGNOSIS — E78.00 PURE HYPERCHOLESTEROLEMIA: ICD-10-CM

## 2022-12-30 DIAGNOSIS — K21.9 GASTROESOPHAGEAL REFLUX DISEASE WITHOUT ESOPHAGITIS: ICD-10-CM

## 2022-12-30 DIAGNOSIS — I10 PRIMARY HYPERTENSION: ICD-10-CM

## 2022-12-30 DIAGNOSIS — I10 HYPERTENSION, UNSPECIFIED TYPE: ICD-10-CM

## 2022-12-30 LAB
ANION GAP SERPL CALCULATED.3IONS-SCNC: 9 MMOL/L (ref 7–15)
BUN SERPL-MCNC: 15.6 MG/DL (ref 8–23)
CALCIUM SERPL-MCNC: 9.3 MG/DL (ref 8.8–10.2)
CHLORIDE SERPL-SCNC: 102 MMOL/L (ref 98–107)
CHOLEST SERPL-MCNC: 174 MG/DL
CREAT SERPL-MCNC: 0.95 MG/DL (ref 0.51–0.95)
DEPRECATED HCO3 PLAS-SCNC: 25 MMOL/L (ref 22–29)
ERYTHROCYTE [DISTWIDTH] IN BLOOD BY AUTOMATED COUNT: 12.4 % (ref 10–15)
GFR SERPL CREATININE-BSD FRML MDRD: 59 ML/MIN/1.73M2
GLUCOSE SERPL-MCNC: 93 MG/DL (ref 70–99)
HCT VFR BLD AUTO: 37.4 % (ref 35–47)
HDLC SERPL-MCNC: 77 MG/DL
HGB BLD-MCNC: 12.1 G/DL (ref 11.7–15.7)
LDLC SERPL CALC-MCNC: 83 MG/DL
MCH RBC QN AUTO: 30.4 PG (ref 26.5–33)
MCHC RBC AUTO-ENTMCNC: 32.4 G/DL (ref 31.5–36.5)
MCV RBC AUTO: 94 FL (ref 78–100)
NONHDLC SERPL-MCNC: 97 MG/DL
PLATELET # BLD AUTO: 312 10E3/UL (ref 150–450)
POTASSIUM SERPL-SCNC: 4.1 MMOL/L (ref 3.4–5.3)
RBC # BLD AUTO: 3.98 10E6/UL (ref 3.8–5.2)
SODIUM SERPL-SCNC: 136 MMOL/L (ref 136–145)
TRIGL SERPL-MCNC: 72 MG/DL
WBC # BLD AUTO: 8.2 10E3/UL (ref 4–11)

## 2022-12-30 PROCEDURE — 85027 COMPLETE CBC AUTOMATED: CPT | Performed by: INTERNAL MEDICINE

## 2022-12-30 PROCEDURE — 80048 BASIC METABOLIC PNL TOTAL CA: CPT | Performed by: INTERNAL MEDICINE

## 2022-12-30 PROCEDURE — 99214 OFFICE O/P EST MOD 30 MIN: CPT | Mod: 25 | Performed by: INTERNAL MEDICINE

## 2022-12-30 PROCEDURE — 80061 LIPID PANEL: CPT | Performed by: INTERNAL MEDICINE

## 2022-12-30 PROCEDURE — 36415 COLL VENOUS BLD VENIPUNCTURE: CPT | Performed by: INTERNAL MEDICINE

## 2022-12-30 PROCEDURE — G0439 PPPS, SUBSEQ VISIT: HCPCS | Performed by: INTERNAL MEDICINE

## 2022-12-30 RX ORDER — LOSARTAN POTASSIUM 50 MG/1
50 TABLET ORAL 2 TIMES DAILY
COMMUNITY
End: 2022-12-30

## 2022-12-30 RX ORDER — ATORVASTATIN CALCIUM 20 MG/1
20 TABLET, FILM COATED ORAL DAILY
Qty: 90 TABLET | Refills: 1 | Status: SHIPPED | OUTPATIENT
Start: 2022-12-30 | End: 2023-08-24

## 2022-12-30 RX ORDER — LOSARTAN POTASSIUM 50 MG/1
50 TABLET ORAL 2 TIMES DAILY
Qty: 90 TABLET | Refills: 1 | Status: SHIPPED | OUTPATIENT
Start: 2022-12-30 | End: 2023-06-05

## 2022-12-30 ASSESSMENT — ENCOUNTER SYMPTOMS
DIZZINESS: 0
SHORTNESS OF BREATH: 0
JOINT SWELLING: 0
HEMATURIA: 0
HEADACHES: 0
COUGH: 1
NAUSEA: 0
PALPITATIONS: 0
HEARTBURN: 0
ABDOMINAL PAIN: 0
CHILLS: 0
BREAST MASS: 0
DIARRHEA: 0
SORE THROAT: 0
ARTHRALGIAS: 0
DYSURIA: 0
WEAKNESS: 0
MYALGIAS: 0
PARESTHESIAS: 0
CONSTIPATION: 0
EYE PAIN: 0
HEMATOCHEZIA: 0
FEVER: 0
FREQUENCY: 0
NERVOUS/ANXIOUS: 0

## 2022-12-30 ASSESSMENT — ACTIVITIES OF DAILY LIVING (ADL): CURRENT_FUNCTION: NO ASSISTANCE NEEDED

## 2022-12-30 ASSESSMENT — PATIENT HEALTH QUESTIONNAIRE - PHQ9
SUM OF ALL RESPONSES TO PHQ QUESTIONS 1-9: 0
10. IF YOU CHECKED OFF ANY PROBLEMS, HOW DIFFICULT HAVE THESE PROBLEMS MADE IT FOR YOU TO DO YOUR WORK, TAKE CARE OF THINGS AT HOME, OR GET ALONG WITH OTHER PEOPLE: NOT DIFFICULT AT ALL
SUM OF ALL RESPONSES TO PHQ QUESTIONS 1-9: 0

## 2022-12-30 NOTE — ASSESSMENT & PLAN NOTE
GERD, h/o esophagitis   - grade C; EGD in '14 ([facility])   - pathology showing no evidence of Barretts esophagus   - maintained on omeprazole 20mg BID  Fall, 2022 - evaluated through [facility], including upper GI and EGD   - recommending she follow-up with [facility] if continues to be troubled by persistent cough

## 2022-12-30 NOTE — ASSESSMENT & PLAN NOTE
h/o severe iron deficiency anemia - resolved   - Hgb 6.2 ('14) - related to esophagitis, normal colonoscopy in '14   - Hgb since normalized   - monitor CBC annually

## 2022-12-30 NOTE — PROGRESS NOTES
"SUBJECTIVE:   Lorna is a 82 year old who presents for Preventive Visit.  Recently had diagnostic studies to her [facility] in October related to worsening reflux symptoms.  She was seen by gastroenterologist who doubled up her omeprazole to 20 mg twice daily.  She underwent EGD demonstrating a large hiatal hernia involving entirety of the gastric fundus.  Also had fluoroscopic upper GI study showing normal swallowing mechanism and normal caliber esophagus with no stenosis or stricture.  Despite omeprazole dose changes describes copious coughing, though does not note any pattern after eating.  Denies any reflux pain that was never had any real degree of odynophagia or painful reflux.  Also seen by cardiology.  Had echocardiogram done showing normal ejection fraction no significant disease (no LVH)  Patient has been advised of split billing requirements and indicates understanding: Yes  Are you in the first 12 months of your Medicare coverage?  No    Healthy Habits:     In general, how would you rate your overall health?  Good    Frequency of exercise:  6-7 days/week    Duration of exercise:  30-45 minutes    Do you usually eat at least 4 servings of fruit and vegetables a day, include whole grains    & fiber and avoid regularly eating high fat or \"junk\" foods?  Yes    Taking medications regularly:  Yes    Medication side effects:  None    Ability to successfully perform activities of daily living:  No assistance needed    Home Safety:  No safety concerns identified    Hearing Impairment:  No hearing concerns    In the past 6 months, have you been bothered by leaking of urine?  No    In general, how would you rate your overall mental or emotional health?  Good      PHQ-2 Total Score: 0    Additional concerns today:  No      Have you ever done Advance Care Planning? (For example, a Health Directive, POLST, or a discussion with a medical provider or your loved ones about your wishes): No, advance care planning information " given to patient to review.  Patient declined advance care planning discussion at this time.      Right Ear:      1000 Hz RESPONSE- on Level:   20 db  (Conditioning sound)   1000 Hz: RESPONSE- on Level:   20 db    2000 Hz: RESPONSE- on Level:   20 db    4000 Hz: RESPONSE- on Level:   20 db     Left Ear:      4000 Hz: RESPONSE- on Level:   20 db    2000 Hz: RESPONSE- on Level:   20 db    1000 Hz: RESPONSE- on Level:   20 db     500 Hz: RESPONSE- on Level: 25 db      Hearing Acuity: Pass    Hearing Assessment: normal   Fall risk  Fallen 2 or more times in the past year?: No  Any fall with injury in the past year?: No    Cognitive Screening normal cognition      Reviewed and updated as needed this visit by clinical staff   Tobacco  Allergies  Meds              Reviewed and updated as needed this visit by Provider                 Social History     Tobacco Use     Smoking status: Never     Smokeless tobacco: Not on file   Substance Use Topics     Alcohol use: Not on file       Alcohol Use 12/30/2022   Prescreen: >3 drinks/day or >7 drinks/week? Not Applicable       Current providers sharing in care for this patient include:   Patient Care Team:  Doroteo Aguiar MD as PCP - General (HOSPITALIST)  Deisi Deng MD as MD (Family Medicine)  Doroteo Aguiar MD as Assigned PCP    The following health maintenance items are reviewed in Epic and correct as of today:  Health Maintenance   Topic Date Due     ANNUAL REVIEW OF HM ORDERS  Never done     ADVANCE CARE PLANNING  Never done     MEDICARE ANNUAL WELLNESS VISIT  12/03/2021     DTAP/TDAP/TD IMMUNIZATION (5 - Td or Tdap) 12/21/2022     FALL RISK ASSESSMENT  12/30/2023     DEXA  12/05/2034     PHQ-2 (once per calendar year)  Completed     INFLUENZA VACCINE  Completed     Pneumococcal Vaccine: 65+ Years  Completed     ZOSTER IMMUNIZATION  Completed     COVID-19 Vaccine  Completed     IPV IMMUNIZATION  Aged Out     MENINGITIS IMMUNIZATION  Aged Out       Mammogram  "Screening - Mammography discussed and declined  Pertinent mammograms are reviewed under the imaging tab.    Review of Systems   Constitutional: Negative for chills and fever.   HENT: Negative for congestion, ear pain, hearing loss and sore throat.    Eyes: Negative for pain and visual disturbance.   Respiratory: Positive for cough. Negative for shortness of breath.    Cardiovascular: Negative for chest pain, palpitations and peripheral edema.   Gastrointestinal: Negative for abdominal pain, constipation, diarrhea, heartburn, hematochezia and nausea.   Breasts:  Negative for tenderness, breast mass and discharge.   Genitourinary: Negative for dysuria, frequency, genital sores, hematuria, pelvic pain, urgency, vaginal bleeding and vaginal discharge.   Musculoskeletal: Negative for arthralgias, joint swelling and myalgias.   Skin: Negative for rash.   Neurological: Negative for dizziness, weakness, headaches and paresthesias.   Psychiatric/Behavioral: Negative for mood changes. The patient is not nervous/anxious.          OBJECTIVE:   BP (!) 201/95 (BP Location: Left arm, Patient Position: Sitting, Cuff Size: Adult Regular)   Pulse 96   Ht 1.613 m (5' 3.5\")   Wt 72.1 kg (159 lb)   BMI 27.72 kg/m   Estimated body mass index is 27.72 kg/m  as calculated from the following:    Height as of this encounter: 1.613 m (5' 3.5\").    Weight as of this encounter: 72.1 kg (159 lb).  Physical Exam  GENERAL: healthy, alert and no distress  NECK: no adenopathy, no asymmetry, masses, or scars and thyroid normal to palpation  RESP: lungs clear to auscultation - no rales, rhonchi or wheezes  CV: regular rate and rhythm, normal S1 S2, no S3 or S4, no murmur, click or rub, no peripheral edema and peripheral pulses strong  ABDOMEN: soft, nontender, no hepatosplenomegaly, no masses and bowel sounds normal  MS: no gross musculoskeletal defects noted, no edema    Diagnostic Test Results:  Labs reviewed in Epic    ASSESSMENT / PLAN: "     Problem List Items Addressed This Visit        Digestive    Gastroesophageal reflux disease without esophagitis     GERD, h/o esophagitis   - grade C; EGD in '14 ([facility])   - pathology showing no evidence of Barretts esophagus   - maintained on omeprazole 20mg BID  Fall, 2022 - evaluated through [facility], including upper GI and EGD   - recommending she follow-up with [facility] if continues to be troubled by persistent cough         Relevant Medications    omeprazole (PRILOSEC) 20 MG DR capsule       Endocrine    Pure hypercholesterolemia    Relevant Medications    atorvastatin (LIPITOR) 20 MG tablet    Other Relevant Orders    Lipid panel reflex to direct LDL Fasting (Completed)       Circulatory    Primary hypertension     hypertension, uncontrolled (significant whitecoat component)  current antihypertensive regimen: losartan 50mg at bedtime  regimen changes: none  intolerance: amlodipine (palpitations, bradycardia), atenolol (bradycardia, fatigue)   - prior radial cuff with poor correlation with in-office readings   - subsequently obtained brachial cuff which correlates better today with in-office readings   - 9/2022: [facility] echo - no LVH    - reviewed home readings, more consistently in 120s-140s            Relevant Medications    losartan (COZAAR) 50 MG tablet    Other Relevant Orders    Basic metabolic panel (Completed)       Hematologic    Iron deficiency anemia     h/o severe iron deficiency anemia - resolved   - Hgb 6.2 ('14) - related to esophagitis, normal colonoscopy in '14   - Hgb since normalized   - monitor CBC annually         Relevant Orders    CBC with platelets (Completed)       Other    Health care maintenance - Primary     - osteoporosis based on '16 DEXA     - on calcium/vitamin D daily     - last DEXA in '19     - begun on Reclast (need to assess with infusion center on number of cumulative dosing, typically treat for 3 yrs)   - no further breast or CRC screening advised   - mixed hyperlipidemia;  "high HDL and LDL    - previously recommended coronary calcium scoring - did not complete    - on atorvastatin - subsequent normalization of LDL         Relevant Orders    REVIEW OF HEALTH MAINTENANCE PROTOCOL ORDERS (Completed)             COUNSELING:  Reviewed preventive health counseling, as reflected in patient instructions      BMI:   Estimated body mass index is 27.72 kg/m  as calculated from the following:    Height as of this encounter: 1.613 m (5' 3.5\").    Weight as of this encounter: 72.1 kg (159 lb).       She reports that she has never smoked. She does not have any smokeless tobacco history on file.      Appropriate preventive services were discussed with this patient, including applicable screening as appropriate for cardiovascular disease, diabetes, osteopenia/osteoporosis, and glaucoma.  As appropriate for age/gender, discussed screening for colorectal cancer, prostate cancer, breast cancer, and cervical cancer. Checklist reviewing preventive services available has been given to the patient.    Reviewed patients plan of care and provided an AVS. The Basic Care Plan (routine screening as documented in Health Maintenance) for Lorna meets the Care Plan requirement. This Care Plan has been established and reviewed with the Patient.          Doroteo Aguiar MD  [facility]    Identified Health Risks:  Answers for HPI/ROS submitted by the patient on 12/30/2022  If you checked off any problems, how difficult have these problems made it for you to do your work, take care of things at home, or get along with other people?: Not difficult at all  PHQ9 TOTAL SCORE: 0      "

## 2022-12-30 NOTE — ASSESSMENT & PLAN NOTE
hypertension, uncontrolled (significant whitecoat component)  current antihypertensive regimen: losartan 50mg at bedtime  regimen changes: none  intolerance: amlodipine (palpitations, bradycardia), atenolol (bradycardia, fatigue)   - prior radial cuff with poor correlation with in-office readings   - subsequently obtained brachial cuff which correlates better today with in-office readings   - 9/2022: [facility] echo - no LVH    - reviewed home readings, more consistently in 120s-140s

## 2022-12-30 NOTE — LETTER
January 3, 2023      Lorna Muse  [address]   [facility] [id number]        Dear ,    We are writing to inform you of your test results.    Labs appearing stable.  No concerns.       Resulted Orders   Basic metabolic panel   Result Value Ref Range    Sodium 136 136 - 145 mmol/L    Potassium 4.1 3.4 - 5.3 mmol/L    Chloride 102 98 - 107 mmol/L    Carbon Dioxide (CO2) 25 22 - 29 mmol/L    Anion Gap 9 7 - 15 mmol/L    Urea Nitrogen 15.6 8.0 - 23.0 mg/dL    Creatinine 0.95 0.51 - 0.95 mg/dL    Calcium 9.3 8.8 - 10.2 mg/dL    Glucose 93 70 - 99 mg/dL    GFR Estimate 59 (L) >60 mL/min/1.73m2      Comment:      Effective December 21, 2021 eGFRcr in adults is calculated using the 2021 CKD-EPI creatinine equation which includes age and gender (Juanjose honeycutt al., [place], DOI: 10.1056/IJTYlx6067859)   Lipid panel reflex to direct LDL Fasting   Result Value Ref Range    Cholesterol 174 <200 mg/dL    Triglycerides 72 <150 mg/dL    Direct Measure HDL 77 >=50 mg/dL    LDL Cholesterol Calculated 83 <=100 mg/dL    Non HDL Cholesterol 97 <130 mg/dL    Narrative    Cholesterol  Desirable:  <200 mg/dL    Triglycerides  Normal:  Less than 150 mg/dL  Borderline High:  150-199 mg/dL  High:  200-499 mg/dL  Very High:  Greater than or equal to 500 mg/dL    Direct Measure HDL  Female:  Greater than or equal to 50 mg/dL   Male:  Greater than or equal to 40 mg/dL    LDL Cholesterol  Desirable:  <100mg/dL  Above Desirable:  100-129 mg/dL   Borderline High:  130-159 mg/dL   High:  160-189 mg/dL   Very High:  >= 190 mg/dL    Non HDL Cholesterol  Desirable:  130 mg/dL  Above Desirable:  130-159 mg/dL  Borderline High:  160-189 mg/dL  High:  190-219 mg/dL  Very High:  Greater than or equal to 220 mg/dL   CBC with platelets   Result Value Ref Range    WBC Count 8.2 4.0 - 11.0 10e3/uL    RBC Count 3.98 3.80 - 5.20 10e6/uL    Hemoglobin 12.1 11.7 - 15.7 g/dL    Hematocrit 37.4 35.0 - 47.0 %    MCV 94 78 - 100 fL    MCH 30.4 26.5 - 33.0 pg    MCHC  32.4 31.5 - 36.5 g/dL    RDW 12.4 10.0 - 15.0 %    Platelet Count 312 150 - 450 10e3/uL       If you have any questions or concerns, please call the clinic at the number listed above.       Sincerely,      Doroteo Aguiar MD

## 2023-05-03 ENCOUNTER — ALLIED HEALTH/NURSE VISIT (OUTPATIENT)
Dept: FAMILY MEDICINE | Facility: CLINIC | Age: 83
End: 2023-05-03
Payer: COMMERCIAL

## 2023-05-03 DIAGNOSIS — Z23 NEED FOR VACCINATION: Primary | ICD-10-CM

## 2023-05-03 PROCEDURE — 91313 COVID-19 BIVALENT 18+ (MODERNA): CPT

## 2023-05-03 PROCEDURE — 99207 PR NO CHARGE NURSE ONLY: CPT

## 2023-05-03 PROCEDURE — 0134A COVID-19 BIVALENT 18+ (MODERNA): CPT

## 2023-05-03 NOTE — PROGRESS NOTES
Prior to immunization administration, verified patients identity using patient s name and date of birth. Please see Immunization Activity for additional information.     Screening Questionnaire for Adult Immunization    Are you sick today?   No   Do you have allergies to medications, food, a vaccine component or latex?   No   Have you ever had a serious reaction after receiving a vaccination?   No   Do you have a long-term health problem with heart, lung, kidney, or metabolic disease (e.g., diabetes), asthma, a blood disorder, no spleen, complement component deficiency, a cochlear implant, or a spinal fluid leak?  Are you on long-term aspirin therapy?   No   Do you have cancer, leukemia, HIV/AIDS, or any other immune system problem?   No   Do you have a parent, brother, or sister with an immune system problem?   No   In the past 3 months, have you taken medications that affect  your immune system, such as prednisone, other steroids, or anticancer drugs; drugs for the treatment of rheumatoid arthritis, Crohn s disease, or psoriasis; or have you had radiation treatments?   No   Have you had a seizure, or a brain or other nervous system problem?   No   During the past year, have you received a transfusion of blood or blood    products, or been given immune (gamma) globulin or antiviral drug?   No   For women: Are you pregnant or is there a chance you could become       pregnant during the next month?   No   Have you received any vaccinations in the past 4 weeks?   No     Immunization questionnaire answers were all negative.    I have reviewed the following standing orders:   This patient is due and qualifies for the Covid-19 vaccine.     Click here for COVID-19 Standing Order    I have reviewed the vaccines inclusion and exclusion criteria; No concerns regarding eligibility.     Injection of COVID Bivalent given by Rachel Larry MA. Patient instructed to remain in clinic for 15 minutes afterwards, and to report  any adverse reactions.     Screening performed by Rachel Larry MA on 5/3/2023 at 11:51 AM.

## 2023-06-03 DIAGNOSIS — I10 PRIMARY HYPERTENSION: ICD-10-CM

## 2023-06-05 RX ORDER — LOSARTAN POTASSIUM 50 MG/1
TABLET ORAL
Qty: 180 TABLET | Refills: 3 | Status: SHIPPED | OUTPATIENT
Start: 2023-06-05 | End: 2024-05-28

## 2023-06-05 NOTE — TELEPHONE ENCOUNTER
"      Last office visit: 12/30/2022     Future Appointments 6/5/2023 - 12/2/2023    None          Requested Prescriptions   Pending Prescriptions Disp Refills     losartan (COZAAR) 50 MG tablet [Pharmacy Med Name: LOSARTAN POTASSIUM 50 MG TAB] 180 tablet      Sig: TAKE 1 TABLET BY MOUTH TWICE DAILY       Angiotensin-II Receptors Failed - 6/3/2023  1:12 AM        Failed - Last blood pressure under 140/90 in past 12 months     BP Readings from Last 3 Encounters:   12/30/22 (!) 209/90   02/28/22 110/64   01/19/22 (!) 189/74                 Passed - Recent (12 mo) or future (30 days) visit within the authorizing provider's specialty     Patient has had an office visit with the authorizing provider or a provider within the authorizing providers department within the previous 12 mos or has a future within next 30 days. See \"Patient Info\" tab in inbasket, or \"Choose Columns\" in Meds & Orders section of the refill encounter.              Passed - Medication is active on med list        Passed - Patient is age 18 or older        Passed - No active pregnancy on record        Passed - Normal serum creatinine on file in past 12 months     Recent Labs   Lab Test 12/30/22  1159   CR 0.95       Ok to refill medication if creatinine is low          Passed - Normal serum potassium on file in past 12 months     Recent Labs   Lab Test 12/30/22  1159   POTASSIUM 4.1                    Passed - No positive pregnancy test in past 12 months                   "

## 2023-08-24 DIAGNOSIS — E78.00 PURE HYPERCHOLESTEROLEMIA: ICD-10-CM

## 2023-08-24 RX ORDER — ATORVASTATIN CALCIUM 20 MG/1
20 TABLET, FILM COATED ORAL DAILY
Qty: 90 TABLET | Refills: 0 | Status: SHIPPED | OUTPATIENT
Start: 2023-08-24 | End: 2023-11-20

## 2023-08-24 NOTE — TELEPHONE ENCOUNTER
"    Last office visit: 12/30/2022   Last RX: 12/30/22  #90  R-1    Future Appointments 8/24/2023 - 2/20/2024      None            Requested Prescriptions   Pending Prescriptions Disp Refills    atorvastatin (LIPITOR) 20 MG tablet [Pharmacy Med Name: ATORVASTATIN 20 MG TABLET] 90 tablet 1     Sig: TAKE 1 TABLET (20 MG) BY MOUTH DAILY       Statins Protocol Passed - 8/24/2023 12:45 AM        Passed - LDL on file in past 12 months     Recent Labs   Lab Test 12/30/22  1159   LDL 83             Passed - No abnormal creatine kinase in past 12 months     No lab results found.             Passed - Recent (12 mo) or future (30 days) visit within the authorizing provider's specialty     Patient has had an office visit with the authorizing provider or a provider within the authorizing providers department within the previous 12 mos or has a future within next 30 days. See \"Patient Info\" tab in inbasket, or \"Choose Columns\" in Meds & Orders section of the refill encounter.              Passed - Medication is active on med list        Passed - Patient is age 18 or older        Passed - No active pregnancy on record        Passed - No positive pregnancy test in past 12 months                   "

## 2023-11-19 DIAGNOSIS — E78.00 PURE HYPERCHOLESTEROLEMIA: ICD-10-CM

## 2023-11-20 RX ORDER — ATORVASTATIN CALCIUM 20 MG/1
20 TABLET, FILM COATED ORAL DAILY
Qty: 90 TABLET | Refills: 0 | Status: SHIPPED | OUTPATIENT
Start: 2023-11-20 | End: 2024-01-02

## 2023-12-27 DIAGNOSIS — K21.9 GASTROESOPHAGEAL REFLUX DISEASE WITHOUT ESOPHAGITIS: ICD-10-CM

## 2024-01-02 ENCOUNTER — OFFICE VISIT (OUTPATIENT)
Dept: FAMILY MEDICINE | Facility: CLINIC | Age: 84
End: 2024-01-02
Payer: COMMERCIAL

## 2024-01-02 VITALS
HEIGHT: 64 IN | OXYGEN SATURATION: 96 % | SYSTOLIC BLOOD PRESSURE: 128 MMHG | HEART RATE: 59 BPM | RESPIRATION RATE: 16 BRPM | DIASTOLIC BLOOD PRESSURE: 66 MMHG | TEMPERATURE: 97.6 F | BODY MASS INDEX: 27.14 KG/M2 | WEIGHT: 159 LBS

## 2024-01-02 DIAGNOSIS — Z00.00 ENCOUNTER FOR MEDICARE ANNUAL WELLNESS EXAM: Primary | ICD-10-CM

## 2024-01-02 DIAGNOSIS — E78.00 PURE HYPERCHOLESTEROLEMIA: ICD-10-CM

## 2024-01-02 DIAGNOSIS — I10 PRIMARY HYPERTENSION: ICD-10-CM

## 2024-01-02 DIAGNOSIS — M81.0 AGE-RELATED OSTEOPOROSIS WITHOUT CURRENT PATHOLOGICAL FRACTURE: ICD-10-CM

## 2024-01-02 DIAGNOSIS — D50.0 IRON DEFICIENCY ANEMIA DUE TO CHRONIC BLOOD LOSS: ICD-10-CM

## 2024-01-02 DIAGNOSIS — N18.30 HYPERTENSIVE HEART AND CHRONIC KIDNEY DISEASE STAGE 3 (H): ICD-10-CM

## 2024-01-02 DIAGNOSIS — I13.10 HYPERTENSIVE HEART AND CHRONIC KIDNEY DISEASE STAGE 3 (H): ICD-10-CM

## 2024-01-02 DIAGNOSIS — Z00.00 HEALTH CARE MAINTENANCE: ICD-10-CM

## 2024-01-02 DIAGNOSIS — K21.9 GASTROESOPHAGEAL REFLUX DISEASE WITHOUT ESOPHAGITIS: ICD-10-CM

## 2024-01-02 PROBLEM — E66.9 OBESITY: Status: RESOLVED | Noted: 2022-02-28 | Resolved: 2024-01-02

## 2024-01-02 LAB
ANION GAP SERPL CALCULATED.3IONS-SCNC: 8 MMOL/L (ref 7–15)
BUN SERPL-MCNC: 17.4 MG/DL (ref 8–23)
CALCIUM SERPL-MCNC: 9.7 MG/DL (ref 8.8–10.2)
CHLORIDE SERPL-SCNC: 102 MMOL/L (ref 98–107)
CREAT SERPL-MCNC: 1.11 MG/DL (ref 0.51–0.95)
DEPRECATED HCO3 PLAS-SCNC: 26 MMOL/L (ref 22–29)
EGFRCR SERPLBLD CKD-EPI 2021: 49 ML/MIN/1.73M2
ERYTHROCYTE [DISTWIDTH] IN BLOOD BY AUTOMATED COUNT: 12.6 % (ref 10–15)
GLUCOSE SERPL-MCNC: 98 MG/DL (ref 70–99)
HCT VFR BLD AUTO: 39.1 % (ref 35–47)
HGB BLD-MCNC: 12.7 G/DL (ref 11.7–15.7)
LDLC SERPL DIRECT ASSAY-MCNC: 87 MG/DL
MCH RBC QN AUTO: 31 PG (ref 26.5–33)
MCHC RBC AUTO-ENTMCNC: 32.5 G/DL (ref 31.5–36.5)
MCV RBC AUTO: 95 FL (ref 78–100)
PLATELET # BLD AUTO: 298 10E3/UL (ref 150–450)
POTASSIUM SERPL-SCNC: 4.8 MMOL/L (ref 3.4–5.3)
RBC # BLD AUTO: 4.1 10E6/UL (ref 3.8–5.2)
SODIUM SERPL-SCNC: 136 MMOL/L (ref 135–145)
WBC # BLD AUTO: 7.7 10E3/UL (ref 4–11)

## 2024-01-02 PROCEDURE — 80048 BASIC METABOLIC PNL TOTAL CA: CPT | Performed by: INTERNAL MEDICINE

## 2024-01-02 PROCEDURE — 83721 ASSAY OF BLOOD LIPOPROTEIN: CPT | Performed by: INTERNAL MEDICINE

## 2024-01-02 PROCEDURE — G0439 PPPS, SUBSEQ VISIT: HCPCS | Performed by: INTERNAL MEDICINE

## 2024-01-02 PROCEDURE — 99213 OFFICE O/P EST LOW 20 MIN: CPT | Mod: 25 | Performed by: INTERNAL MEDICINE

## 2024-01-02 PROCEDURE — 85027 COMPLETE CBC AUTOMATED: CPT | Performed by: INTERNAL MEDICINE

## 2024-01-02 PROCEDURE — 36415 COLL VENOUS BLD VENIPUNCTURE: CPT | Performed by: INTERNAL MEDICINE

## 2024-01-02 RX ORDER — ATORVASTATIN CALCIUM 20 MG/1
20 TABLET, FILM COATED ORAL DAILY
Qty: 90 TABLET | Refills: 3 | Status: SHIPPED | OUTPATIENT
Start: 2024-01-02

## 2024-01-02 ASSESSMENT — ENCOUNTER SYMPTOMS
DIZZINESS: 0
PARESTHESIAS: 0
BREAST MASS: 0
ARTHRALGIAS: 0
PALPITATIONS: 0
NERVOUS/ANXIOUS: 0
MYALGIAS: 0
DIARRHEA: 0
FREQUENCY: 0
HEMATOCHEZIA: 0
SORE THROAT: 0
COUGH: 1
HEMATURIA: 0
WEAKNESS: 0
FEVER: 0
JOINT SWELLING: 0
NAUSEA: 0
ABDOMINAL PAIN: 0
HEARTBURN: 0
HEADACHES: 0
CHILLS: 0
SHORTNESS OF BREATH: 0
EYE PAIN: 0
CONSTIPATION: 0
DYSURIA: 0

## 2024-01-02 ASSESSMENT — ACTIVITIES OF DAILY LIVING (ADL): CURRENT_FUNCTION: NO ASSISTANCE NEEDED

## 2024-01-02 NOTE — ASSESSMENT & PLAN NOTE
- osteoporosis based on '16 DEXA     - on calcium/vitamin D daily     - last DEXA in '19     - completed 3 yrs of IV Reclast therapy

## 2024-01-02 NOTE — PROGRESS NOTES
"SUBJECTIVE:   Lorna is a 83 year old, presenting for the following: Returns for annual follow-up.  Generally doing well.  No recent reported health problems.  Still with persistent coughing and postprandial timeframe.  Has not sought out any further [facility] attention.  Reduced her omeprazole down to 20 mg daily.  Brings in home blood pressure log for review.  Still has quite labile blood pressures in office settings  Physical        1/2/2024    12:36 PM   Additional Questions   Roomed by LA       Are you in the first 12 months of your Medicare coverage?  No    Healthy Habits:     In general, how would you rate your overall health?  Good    Frequency of exercise:  6-7 days/week    Duration of exercise:  45-60 minutes    Do you usually eat at least 4 servings of fruit and vegetables a day, include whole grains    & fiber and avoid regularly eating high fat or \"junk\" foods?  Yes    Taking medications regularly:  Yes    Medication side effects:  Not applicable    Ability to successfully perform activities of daily living:  No assistance needed    Home Safety:  No safety concerns identified    Hearing Impairment:  No hearing concerns    In the past 6 months, have you been bothered by leaking of urine?  No    In general, how would you rate your overall mental or emotional health?  Excellent    Additional concerns today:  No      Today's PHQ-2 Score:       1/2/2024    12:34 PM   PHQ-2 ( 1999 [employer])   Q1: Little interest or pleasure in doing things 0   Q2: Feeling down, depressed or hopeless 0   PHQ-2 Score 0   Q1: Little interest or pleasure in doing things Not at all   Q2: Feeling down, depressed or hopeless Not at all   PHQ-2 Score 0           Have you ever done Advance Care Planning? (For example, a Health Directive, POLST, or a discussion with a medical provider or your loved ones about your wishes): No, advance care planning information given to patient to review.  Patient declined advance care planning discussion " at this time.       Fall risk  Fallen 2 or more times in the past year?: No  Any fall with injury in the past year?: No    Cognitive Screening normal cognition      Reviewed and updated as needed this visit by clinical staff   Tobacco  Allergies  Meds              Reviewed and updated as needed this visit by Provider                 Social History     Tobacco Use    Smoking status: Never     Passive exposure: Never    Smokeless tobacco: Never   Substance Use Topics    Alcohol use: Not on file             1/2/2024    12:33 PM   Alcohol Use   Prescreen: >3 drinks/day or >7 drinks/week? Not Applicable     Do you have a current opioid prescription? No  Do you use any other controlled substances or medications that are not prescribed by a provider? None        Current providers sharing in care for this patient include:   Patient Care Team:  Doroteo Aguiar MD as PCP - General (HOSPITALIST)  Deisi Deng MD as MD (Family Medicine)  Doroteo Aguiar MD as Assigned PCP    The following health maintenance items are reviewed in Epic and correct as of today:  Health Maintenance   Topic Date Due    ANNUAL REVIEW OF HM ORDERS  12/30/2023    MEDICARE ANNUAL WELLNESS VISIT  12/30/2023    FALL RISK ASSESSMENT  01/02/2025    ADVANCE CARE PLANNING  01/02/2028    DTAP/TDAP/TD IMMUNIZATION (6 - Td or Tdap) 05/30/2033    DEXA  12/05/2034    PHQ-2 (once per calendar year)  Completed    INFLUENZA VACCINE  Completed    Pneumococcal Vaccine: 65+ Years  Completed    ZOSTER IMMUNIZATION  Completed    RSV VACCINE (Pregnancy & 60+)  Completed    COVID-19 Vaccine  Completed    IPV IMMUNIZATION  Aged Out    HPV IMMUNIZATION  Aged Out    MENINGITIS IMMUNIZATION  Aged Out    RSV MONOCLONAL ANTIBODY  Aged Out     Current Outpatient Medications   Medication Sig Dispense Refill    atorvastatin (LIPITOR) 20 MG tablet Take 1 tablet (20 mg) by mouth daily 90 tablet 3    calcium carbonate-vitamin D (OSCAL W/D) 500-200 MG-UNIT tablet Take 1  "tablet by mouth 2 times daily      losartan (COZAAR) 50 MG tablet TAKE 1 TABLET BY MOUTH TWICE DAILY 180 tablet 3    omeprazole (PRILOSEC) 20 MG DR capsule Take 1 capsule (20 mg) by mouth daily 90 capsule 3       Mammogram Screening - Mammography discussed and declined  Pertinent mammograms are reviewed under the imaging tab.    Review of Systems   Constitutional:  Negative for chills and fever.   HENT:  Negative for congestion, ear pain, hearing loss and sore throat.    Eyes:  Negative for pain and visual disturbance.   Respiratory:  Positive for cough. Negative for shortness of breath.    Cardiovascular:  Negative for chest pain, palpitations and peripheral edema.   Gastrointestinal:  Negative for abdominal pain, constipation, diarrhea, heartburn, hematochezia and nausea.   Breasts:  Negative for tenderness, breast mass and discharge.   Genitourinary:  Negative for dysuria, frequency, genital sores, hematuria, pelvic pain, urgency, vaginal bleeding and vaginal discharge.   Musculoskeletal:  Negative for arthralgias, joint swelling and myalgias.   Skin:  Negative for rash.   Neurological:  Negative for dizziness, weakness, headaches and paresthesias.   Psychiatric/Behavioral:  Negative for mood changes. The patient is not nervous/anxious.          OBJECTIVE:   /66   Pulse 59   Temp 97.6  F (36.4  C) (Tympanic)   Resp 16   Ht 1.613 m (5' 3.5\")   Wt 72.1 kg (159 lb)   SpO2 96%   BMI 27.72 kg/m   Estimated body mass index is 27.72 kg/m  as calculated from the following:    Height as of this encounter: 1.613 m (5' 3.5\").    Weight as of this encounter: 72.1 kg (159 lb).  Physical Exam  GENERAL: healthy, alert and no distress  NECK: no adenopathy, no asymmetry, masses, or scars and thyroid normal to palpation  RESP: lungs clear to auscultation - no rales, rhonchi or wheezes  CV: regular rate and rhythm, normal S1 S2, no S3 or S4, no murmur, click or rub, no peripheral edema and peripheral pulses " strong  ABDOMEN: soft, nontender, no hepatosplenomegaly, no masses and bowel sounds normal  MS: no gross musculoskeletal defects noted, no edema    Diagnostic Test Results:  Labs reviewed in Epic    ASSESSMENT / PLAN:     Problem List Items Addressed This Visit          Digestive    Gastroesophageal reflux disease without esophagitis     GERD, h/o esophagitis   - grade C; EGD in '14 ([facility])   - pathology showing no evidence of Barretts esophagus   - maintained on omeprazole 20mg daily  Fall, 2022 - evaluated through [facility], including upper GI and EGD   - recommending she follow-up with [facility] if continues to be troubled by persistent cough         Relevant Medications    omeprazole (PRILOSEC) 20 MG DR capsule       Endocrine    Pure hypercholesterolemia     - on atorvastatin 20mg         Relevant Medications    atorvastatin (LIPITOR) 20 MG tablet    Other Relevant Orders    LDL cholesterol direct       Circulatory    Primary hypertension     hypertension, uncontrolled (significant whitecoat component)  current antihypertensive regimen: losartan 50mg BID  regimen changes: none  intolerance: amlodipine (palpitations, bradycardia), atenolol (bradycardia, fatigue)   - prior radial cuff with poor correlation with in-office readings   - subsequently obtained brachial cuff which correlates better today with in-office readings   - 9/2022: [facility] echo - no LVH    - reviewed home readings, more consistently in 120s-140s            Relevant Orders    Basic metabolic panel    Hypertensive heart and chronic kidney disease stage 3 (H)       Musculoskeletal and Integumentary    Age-related osteoporosis without current pathological fracture     - osteoporosis based on '16 DEXA     - on calcium/vitamin D daily     - last DEXA in '19     - completed 3 yrs of IV Reclast therapy            Hematologic    Iron deficiency anemia     h/o severe iron deficiency anemia - resolved   - Hgb 6.2 ('14) - related to esophagitis, normal  "colonoscopy in '14   - Hgb since normalized   - monitor CBC annually         Relevant Orders    CBC with platelets       Other    Health care maintenance        - no further breast or CRC screening advised   - mixed hyperlipidemia; high HDL and LDL    - previously recommended coronary calcium scoring - did not complete    - on atorvastatin - subsequent normalization of LDL          Other Visit Diagnoses       Encounter for Medicare annual wellness exam    -  Primary              COUNSELING:  Reviewed preventive health counseling, as reflected in patient instructions      BMI:   Estimated body mass index is 27.72 kg/m  as calculated from the following:    Height as of this encounter: 1.613 m (5' 3.5\").    Weight as of this encounter: 72.1 kg (159 lb).         She reports that she has never smoked. She has never been exposed to tobacco smoke. She has never used smokeless tobacco.      Appropriate preventive services were discussed with this patient, including applicable screening as appropriate for fall prevention, nutrition, physical activity, Tobacco-use cessation, weight loss and cognition.  Checklist reviewing preventive services available has been given to the patient.    Reviewed patients plan of care and provided an AVS. The Basic Care Plan (routine screening as documented in Health Maintenance) for Lorna meets the Care Plan requirement. This Care Plan has been established and reviewed with the Patient.          Doroteo Aguiar MD  [facility]    Identified Health Risks:  I have reviewed Opioid Use Disorder and Substance Use Disorder risk factors and made any needed referrals.   "

## 2024-01-02 NOTE — ASSESSMENT & PLAN NOTE
GERD, h/o esophagitis   - grade C; EGD in '14 ([facility])   - pathology showing no evidence of Barretts esophagus   - maintained on omeprazole 20mg daily  Fall, 2022 - evaluated through [facility], including upper GI and EGD   - recommending she follow-up with [facility] if continues to be troubled by persistent cough

## 2024-01-02 NOTE — ASSESSMENT & PLAN NOTE
- no further breast or CRC screening advised   - mixed hyperlipidemia; high HDL and LDL    - previously recommended coronary calcium scoring - did not complete    - on atorvastatin - subsequent normalization of LDL

## 2024-01-02 NOTE — LETTER
January 3, 2024      Lorna Muse  [address]   [facility] [address]        Dear ,    We are writing to inform you of your test results.    Your test results fall within the expected range(s) or remain unchanged from previous results.  Please continue with current treatment plan.    Resulted Orders   CBC with platelets   Result Value Ref Range    WBC Count 7.7 4.0 - 11.0 10e3/uL    RBC Count 4.10 3.80 - 5.20 10e6/uL    Hemoglobin 12.7 11.7 - 15.7 g/dL    Hematocrit 39.1 35.0 - 47.0 %    MCV 95 78 - 100 fL    MCH 31.0 26.5 - 33.0 pg    MCHC 32.5 31.5 - 36.5 g/dL    RDW 12.6 10.0 - 15.0 %    Platelet Count 298 150 - 450 10e3/uL   Basic metabolic panel   Result Value Ref Range    Sodium 136 135 - 145 mmol/L      Comment:      Reference intervals for this test were updated on 09/26/2023 to more accurately reflect our healthy population. There may be differences in the flagging of prior results with similar values performed with this method. Interpretation of those prior results can be made in the context of the updated reference intervals.     Potassium 4.8 3.4 - 5.3 mmol/L    Chloride 102 98 - 107 mmol/L    Carbon Dioxide (CO2) 26 22 - 29 mmol/L    Anion Gap 8 7 - 15 mmol/L    Urea Nitrogen 17.4 8.0 - 23.0 mg/dL    Creatinine 1.11 (H) 0.51 - 0.95 mg/dL    GFR Estimate 49 (L) >60 mL/min/1.73m2    Calcium 9.7 8.8 - 10.2 mg/dL    Glucose 98 70 - 99 mg/dL   LDL cholesterol direct   Result Value Ref Range    LDL Cholesterol Direct 87 <100 mg/dL      Comment:      Age 2-19 years:  Desirable: 0-110 mg/dL   Borderline high: 110-129 mg/dL   High: >= 130 mg/dL    Age 20 years and older:  Desirable: <100mg/dL  Above desirable: 100-129 mg/dL   Borderline high: 130-159 mg/dL   High: 160-189 mg/dL   Very high: >= 190 mg/dL       If you have any questions or concerns, please call the clinic at the number listed above.       Sincerely,      Doroteo Aguiar MD

## 2024-01-02 NOTE — ASSESSMENT & PLAN NOTE
hypertension, uncontrolled (significant whitecoat component)  current antihypertensive regimen: losartan 50mg BID  regimen changes: none  intolerance: amlodipine (palpitations, bradycardia), atenolol (bradycardia, fatigue)   - prior radial cuff with poor correlation with in-office readings   - subsequently obtained brachial cuff which correlates better today with in-office readings   - 9/2022: [facility] echo - no LVH    - reviewed home readings, more consistently in 120s-140s

## 2024-01-02 NOTE — PATIENT INSTRUCTIONS
Patient Education   Personalized Prevention Plan  You are due for the preventive services outlined below.  Your care team is available to assist you in scheduling these services.  If you have already completed any of these items, please share that information with your care team to update in your medical record.  Health Maintenance Due   Topic Date Due     ANNUAL REVIEW OF HM ORDERS  12/30/2023     Annual Wellness Visit  12/30/2023

## 2024-05-07 ENCOUNTER — ALLIED HEALTH/NURSE VISIT (OUTPATIENT)
Dept: FAMILY MEDICINE | Facility: CLINIC | Age: 84
End: 2024-05-07
Payer: COMMERCIAL

## 2024-05-07 DIAGNOSIS — Z23 NEED FOR VACCINATION: Primary | ICD-10-CM

## 2024-05-07 PROCEDURE — 99207 PR NO CHARGE NURSE ONLY: CPT

## 2024-05-07 PROCEDURE — 90480 ADMN SARSCOV2 VAC 1/ONLY CMP: CPT

## 2024-05-07 PROCEDURE — 91320 SARSCV2 VAC 30MCG TRS-SUC IM: CPT

## 2024-05-25 DIAGNOSIS — I10 PRIMARY HYPERTENSION: ICD-10-CM

## 2024-05-28 RX ORDER — LOSARTAN POTASSIUM 50 MG/1
50 TABLET ORAL 2 TIMES DAILY
Qty: 180 TABLET | Refills: 3 | Status: SHIPPED | OUTPATIENT
Start: 2024-05-28

## 2024-10-23 ENCOUNTER — IMMUNIZATION (OUTPATIENT)
Dept: FAMILY MEDICINE | Facility: CLINIC | Age: 84
End: 2024-10-23
Payer: COMMERCIAL

## 2024-10-23 DIAGNOSIS — Z23 ENCOUNTER FOR IMMUNIZATION: Primary | ICD-10-CM

## 2024-10-23 PROCEDURE — 91320 SARSCV2 VAC 30MCG TRS-SUC IM: CPT

## 2024-10-23 PROCEDURE — 90662 IIV NO PRSV INCREASED AG IM: CPT

## 2024-10-23 PROCEDURE — G0008 ADMIN INFLUENZA VIRUS VAC: HCPCS

## 2024-10-23 PROCEDURE — 99207 PR NO CHARGE NURSE ONLY: CPT

## 2024-10-23 PROCEDURE — 90480 ADMN SARSCOV2 VAC 1/ONLY CMP: CPT

## 2024-10-23 NOTE — PROGRESS NOTES
Prior to immunization administration, verified patients identity using patient s name and date of birth. Please see Immunization Activity for additional information.     Is the patient's temperature normal (100.5 or less)? Yes     Patient MEETS CRITERIA. PROCEED with vaccine administration.          10/23/2024   COVID   Have you had myocarditis or pericarditis (inflammation of or around the heart muscle) after getting a COVID-19 vaccine? No   Have you had a serious reaction to a COVID vaccine or something in a COVID vaccine, like polyethylene glycol (PEG) or polysorbate? No   Have you had multisystem inflammatory syndrome from COVID-19 in the past 90 days? No   Have you received a bone marrow transplant within the previous 3 months? No            Patient MEETS CRITERIA. PROCEED with vaccine administration.            10/23/2024   INFLUENZA   Would you like to receive the flu shot or the nasal flu vaccine today? Flu Shot   Have you had a serious reaction to a flu vaccine or something in a flu vaccine? No   Have you had Guillain-[place] syndrome within 6 weeks of getting a vaccine? No   Have you received a bone marrow transplant within the previous 6 months? No            Patient MEETS CRITERIA. PROCEED with vaccine administration.        Patient instructed to remain in clinic for 15 minutes afterwards, and to report any adverse reactions.      Link to Ancillary Visit Immunization Standing Orders SmartSet     Screening performed by Kelli Yeboah MA on 10/23/2024 at 10:48 AM.

## 2024-11-15 ENCOUNTER — OFFICE VISIT (OUTPATIENT)
Dept: FAMILY MEDICINE | Facility: CLINIC | Age: 84
End: 2024-11-15
Payer: COMMERCIAL

## 2024-11-15 VITALS
OXYGEN SATURATION: 99 % | TEMPERATURE: 97.8 F | SYSTOLIC BLOOD PRESSURE: 188 MMHG | DIASTOLIC BLOOD PRESSURE: 82 MMHG | RESPIRATION RATE: 10 BRPM | BODY MASS INDEX: 27.49 KG/M2 | HEIGHT: 64 IN | WEIGHT: 161 LBS | HEART RATE: 70 BPM

## 2024-11-15 DIAGNOSIS — I10 PRIMARY HYPERTENSION: ICD-10-CM

## 2024-11-15 DIAGNOSIS — N18.30 HYPERTENSIVE HEART AND CHRONIC KIDNEY DISEASE STAGE 3 (H): Primary | ICD-10-CM

## 2024-11-15 DIAGNOSIS — E78.00 PURE HYPERCHOLESTEROLEMIA: ICD-10-CM

## 2024-11-15 DIAGNOSIS — I13.10 HYPERTENSIVE HEART AND CHRONIC KIDNEY DISEASE STAGE 3 (H): Primary | ICD-10-CM

## 2024-11-15 DIAGNOSIS — Z00.00 HEALTH CARE MAINTENANCE: ICD-10-CM

## 2024-11-15 DIAGNOSIS — M81.0 AGE-RELATED OSTEOPOROSIS WITHOUT CURRENT PATHOLOGICAL FRACTURE: ICD-10-CM

## 2024-11-15 DIAGNOSIS — K21.9 GASTROESOPHAGEAL REFLUX DISEASE WITHOUT ESOPHAGITIS: ICD-10-CM

## 2024-11-15 LAB
ALBUMIN UR-MCNC: NEGATIVE MG/DL
APPEARANCE UR: ABNORMAL
BACTERIA #/AREA URNS HPF: ABNORMAL /HPF
BILIRUB UR QL STRIP: NEGATIVE
COLOR UR AUTO: YELLOW
GLUCOSE UR STRIP-MCNC: NEGATIVE MG/DL
HGB BLD-MCNC: 12 G/DL (ref 11.7–15.7)
HGB UR QL STRIP: NEGATIVE
KETONES UR STRIP-MCNC: NEGATIVE MG/DL
LEUKOCYTE ESTERASE UR QL STRIP: ABNORMAL
MUCOUS THREADS #/AREA URNS LPF: PRESENT /LPF
NITRATE UR QL: POSITIVE
PH UR STRIP: 6 [PH] (ref 5–7)
RBC #/AREA URNS AUTO: ABNORMAL /HPF
SP GR UR STRIP: 1.02 (ref 1–1.03)
SQUAMOUS #/AREA URNS AUTO: ABNORMAL /LPF
UROBILINOGEN UR STRIP-ACNC: 0.2 E.U./DL
WBC #/AREA URNS AUTO: >100 /HPF
WBC CLUMPS #/AREA URNS HPF: PRESENT /HPF

## 2024-11-15 PROCEDURE — 81001 URINALYSIS AUTO W/SCOPE: CPT | Performed by: INTERNAL MEDICINE

## 2024-11-15 PROCEDURE — 85018 HEMOGLOBIN: CPT | Mod: QW | Performed by: INTERNAL MEDICINE

## 2024-11-15 PROCEDURE — 36415 COLL VENOUS BLD VENIPUNCTURE: CPT | Performed by: INTERNAL MEDICINE

## 2024-11-15 RX ORDER — ATORVASTATIN CALCIUM 20 MG/1
20 TABLET, FILM COATED ORAL DAILY
Qty: 90 TABLET | Refills: 3 | Status: SHIPPED | OUTPATIENT
Start: 2024-11-15

## 2024-11-15 RX ORDER — LOSARTAN POTASSIUM 50 MG/1
50 TABLET ORAL 2 TIMES DAILY
Qty: 180 TABLET | Refills: 3 | Status: SHIPPED | OUTPATIENT
Start: 2024-11-15

## 2024-11-15 NOTE — ASSESSMENT & PLAN NOTE
- no further breast or CRC screening advised   - mixed hyperlipidemia; high HDL and LDL    - previously recommended coronary calcium scoring - did not complete    - on atorvastatin

## 2024-11-15 NOTE — PROGRESS NOTES
Assessment & Plan   Problem List Items Addressed This Visit          Digestive    Gastroesophageal reflux disease without esophagitis     GERD, h/o esophagitis   - grade C; EGD in '14 ([facility])   - pathology showing no evidence of Barretts esophagus   - maintained on omeprazole 20mg daily  Fall, 2022 - evaluated through [facility], including upper GI and EGD           Relevant Medications    omeprazole (PRILOSEC) 20 MG DR capsule       Endocrine    Pure hypercholesterolemia     - on atorvastatin 20mg         Relevant Medications    atorvastatin (LIPITOR) 20 MG tablet    Other Relevant Orders    Lipid panel reflex to direct LDL Non-fasting       Circulatory    Primary hypertension     hypertension, uncontrolled (significant whitecoat component)  current antihypertensive regimen: losartan 50mg BID  regimen changes: none  intolerance: amlodipine (palpitations, bradycardia), atenolol (bradycardia, fatigue)   - prior radial cuff with poor correlation with in-office readings   - subsequently obtained brachial cuff which correlates better today with in-office readings   - 9/2022: [facility] echo - no LVH    - reviewed home readings, more consistently in 120s-140s            Relevant Medications    losartan (COZAAR) 50 MG tablet    Hypertensive heart and chronic kidney disease stage 3 (H) - Primary    Relevant Orders    Albumin Random Urine Quantitative with Creat Ratio    UA Macroscopic with reflex to Microscopic and Culture    Hemoglobin (Completed)       Musculoskeletal and Integumentary    Age-related osteoporosis without current pathological fracture     - osteoporosis based on '16 DEXA     - on calcium/vitamin D daily     - last DEXA in '19     - completed 3 yrs of IV Reclast therapy            Other    Health care maintenance      - no further breast or CRC screening advised   - mixed hyperlipidemia; high HDL and LDL    - previously recommended coronary calcium scoring - did not complete    - on atorvastatin           "         BMI  Estimated body mass index is 28.07 kg/m  as calculated from the following:    Height as of this encounter: 1.613 m (5' 3.5\").    Weight as of this encounter: 73 kg (161 lb).       FUTURE APPOINTMENTS:       - Follow-up visit in 12 months      Subjective   Lorna is a 84 year old, presenting for the following health issues: Returns for general follow-up.  Doing well overall.  Relatively stable reflux symptoms if continues to take her Prilosec and avoiding trigger foods or overeating.  Does monitor blood pressures at home which she states are consistently lower in clinic.  Historically with poor tolerance to multiple antihypertensive classes.  Describes some occasional transient upper abdominal discomfort and bloating -has resolved on its own.  Has stayed active with bus 2 hours with her .  Hypertension        11/15/2024     2:11 PM   Additional Questions   Roomed by Rachel CARVALHO     History of Present Illness       Reason for visit:  HTN  Symptom onset:  More than a month  Symptom progression:  Staying the same    She eats 0-1 servings of fruits and vegetables daily.She consumes 0 sweetened beverage(s) daily.She exercises with enough effort to increase her heart rate 60 or more minutes per day.  She exercises with enough effort to increase her heart rate 3 or less days per week.   She is taking medications regularly.         Review of Systems  Constitutional, HEENT, cardiovascular, pulmonary, gi and gu systems are negative, except as otherwise noted.      Objective    BP (!) 190/86   Pulse 70   Temp 97.8  F (36.6  C)   Resp 10   Ht 1.613 m (5' 3.5\")   Wt 73 kg (161 lb)   SpO2 99%   BMI 28.07 kg/m    Body mass index is 28.07 kg/m .  Physical Exam   GENERAL: alert and no distress  NECK: no adenopathy, no asymmetry, masses, or scars  RESP: lungs clear to auscultation - no rales, rhonchi or wheezes  CV: regular rate and rhythm, normal S1 S2, no S3 or S4, no murmur, click or rub, no " peripheral edema  ABDOMEN: soft, nontender, no hepatosplenomegaly, no masses and bowel sounds normal  MS: no gross musculoskeletal defects noted, no edema    Office Visit on 01/02/2024   Component Date Value Ref Range Status    WBC Count 01/02/2024 7.7  4.0 - 11.0 10e3/uL Final    RBC Count 01/02/2024 4.10  3.80 - 5.20 10e6/uL Final    Hemoglobin 01/02/2024 12.7  11.7 - 15.7 g/dL Final    Hematocrit 01/02/2024 39.1  35.0 - 47.0 % Final    MCV 01/02/2024 95  78 - 100 fL Final    MCH 01/02/2024 31.0  26.5 - 33.0 pg Final    MCHC 01/02/2024 32.5  31.5 - 36.5 g/dL Final    RDW 01/02/2024 12.6  10.0 - 15.0 % Final    Platelet Count 01/02/2024 298  150 - 450 10e3/uL Final    Sodium 01/02/2024 136  135 - 145 mmol/L Final    Reference intervals for this test were updated on 09/26/2023 to more accurately reflect our healthy population. There may be differences in the flagging of prior results with similar values performed with this method. Interpretation of those prior results can be made in the context of the updated reference intervals.     Potassium 01/02/2024 4.8  3.4 - 5.3 mmol/L Final    Chloride 01/02/2024 102  98 - 107 mmol/L Final    Carbon Dioxide (CO2) 01/02/2024 26  22 - 29 mmol/L Final    Anion Gap 01/02/2024 8  7 - 15 mmol/L Final    Urea Nitrogen 01/02/2024 17.4  8.0 - 23.0 mg/dL Final    Creatinine 01/02/2024 1.11 (H)  0.51 - 0.95 mg/dL Final    GFR Estimate 01/02/2024 49 (L)  >60 mL/min/1.73m2 Final    Calcium 01/02/2024 9.7  8.8 - 10.2 mg/dL Final    Glucose 01/02/2024 98  70 - 99 mg/dL Final    LDL Cholesterol Direct 01/02/2024 87  <100 mg/dL Final    Age 2-19 years:  Desirable: 0-110 mg/dL   Borderline high: 110-129 mg/dL   High: >= 130 mg/dL    Age 20 years and older:  Desirable: <100mg/dL  Above desirable: 100-129 mg/dL   Borderline high: 130-159 mg/dL   High: 160-189 mg/dL   Very high: >= 190 mg/dL           Signed Electronically by: Doroteo Aguiar MD

## 2024-11-15 NOTE — ASSESSMENT & PLAN NOTE
GERD, h/o esophagitis   - grade C; EGD in '14 ([facility])   - pathology showing no evidence of Barretts esophagus   - maintained on omeprazole 20mg daily  Fall, 2022 - evaluated through [facility], including upper GI and EGD

## 2024-11-15 NOTE — LETTER
November 18, 2024      Lorna Muse  [place]   [facility] [address]        Dear ,    We are writing to inform you of your test results.    Labs are stable.  Lab staff processed a urine culture given inflammatory cells in urine.  Assuming you are not having any burning or discomfort with urination, then no action needed (relatively common to see bacteria colonize in the bladder in patients at your age).  If you have/develop such symptoms, you should notify the clinic for decisions on possible antibiotics.      Resulted Orders   Lipid panel reflex to direct LDL Non-fasting   Result Value Ref Range    Cholesterol 172 <200 mg/dL    Triglycerides 81 <150 mg/dL    Direct Measure HDL 80 >=50 mg/dL    LDL Cholesterol Calculated 76 <100 mg/dL    Non HDL Cholesterol 92 <130 mg/dL    Patient Fasting > 8hrs? No     Narrative    Cholesterol  Desirable: < 200 mg/dL  Borderline High: 200 - 239 mg/dL  High: >= 240 mg/dL    Triglycerides  Normal: < 150 mg/dL  Borderline High: 150 - 199 mg/dL  High: 200-499 mg/dL  Very High: >= 500 mg/dL    Direct Measure HDL  Female: >= 50 mg/dL   Male: >= 40 mg/dL    LDL Cholesterol  Desirable: < 100 mg/dL  Above Desirable: 100 - 129 mg/dL   Borderline High: 130 - 159 mg/dL   High:  160 - 189 mg/dL   Very High: >= 190 mg/dL    Non HDL Cholesterol  Desirable: < 130 mg/dL  Above Desirable: 130 - 159 mg/dL  Borderline High: 160 - 189 mg/dL  High: 190 - 219 mg/dL  Very High: >= 220 mg/dL   Albumin Random Urine Quantitative with Creat Ratio   Result Value Ref Range    Creatinine Urine mg/dL 123.0 mg/dL      Comment:      The reference ranges have not been established in urine creatinine. The results should be integrated into the clinical context for interpretation.    Albumin Urine mg/L 39.7 mg/L      Comment:      The reference ranges have not been established in urine albumin. The results should be integrated into the clinical context for interpretation.    Albumin Urine mg/g Cr 32.28  (H) 0.00 - 25.00 mg/g Cr      Comment:      Microalbuminuria is defined as an albumin:creatinine ratio of 17 to 299 for males and 25 to 299 for females. A ratio of albumin:creatinine of 300 or higher is indicative of overt proteinuria.  Due to biologic variability, positive results should be confirmed by a second, first-morning random or 24-hour timed urine specimen. If there is discrepancy, a third specimen is recommended. When 2 out of 3 results are in the microalbuminuria range, this is evidence for incipient nephropathy and warrants increased efforts at glucose control, blood pressure control, and institution of therapy with an angiotensin-converting-enzyme (ACE) inhibitor (if the patient can tolerate it).     UA Macroscopic with reflex to Microscopic and Culture   Result Value Ref Range    Color Urine Yellow Colorless, Straw, Light Yellow, Yellow    Appearance Urine Cloudy (A) Clear    Glucose Urine Negative Negative mg/dL    Bilirubin Urine Negative Negative    Ketones Urine Negative Negative mg/dL    Specific Gravity Urine 1.020 1.003 - 1.035    Blood Urine Negative Negative    pH Urine 6.0 5.0 - 7.0    Protein Albumin Urine Negative Negative mg/dL    Urobilinogen Urine 0.2 0.2, 1.0 E.U./dL    Nitrite Urine Positive (A) Negative    Leukocyte Esterase Urine Small (A) Negative   Hemoglobin   Result Value Ref Range    Hemoglobin 12.0 11.7 - 15.7 g/dL   Urine Microscopic Exam   Result Value Ref Range    Bacteria Urine Many (A) None Seen /HPF    RBC Urine 0-2 0-2 /HPF /HPF    WBC Urine >100 (A) 0-5 /HPF /HPF    Squamous Epithelials Urine Many (A) None Seen /LPF    WBC Clumps Urine Present (A) None Seen /HPF      Comment:      This is an appended report.  These results have been appended to a previously final verified report.    Mucus Urine Present (A) None Seen /LPF   Urine Culture   Result Value Ref Range    Culture >100,000 CFU/mL Escherichia coli (A)        If you have any questions or concerns, please call the  clinic at the number listed above.       Sincerely,      Doroteo Aguiar MD

## 2024-11-16 LAB
BACTERIA UR CULT: ABNORMAL
CHOLEST SERPL-MCNC: 172 MG/DL
CREAT UR-MCNC: 123 MG/DL
FASTING STATUS PATIENT QL REPORTED: NO
HDLC SERPL-MCNC: 80 MG/DL
LDLC SERPL CALC-MCNC: 76 MG/DL
MICROALBUMIN UR-MCNC: 39.7 MG/L
MICROALBUMIN/CREAT UR: 32.28 MG/G CR (ref 0–25)
NONHDLC SERPL-MCNC: 92 MG/DL
TRIGL SERPL-MCNC: 81 MG/DL

## 2024-12-03 ENCOUNTER — PATIENT OUTREACH (OUTPATIENT)
Dept: CARE COORDINATION | Facility: CLINIC | Age: 84
End: 2024-12-03
Payer: COMMERCIAL

## 2024-12-17 ENCOUNTER — PATIENT OUTREACH (OUTPATIENT)
Dept: CARE COORDINATION | Facility: CLINIC | Age: 84
End: 2024-12-17
Payer: COMMERCIAL

## 2025-01-06 DIAGNOSIS — I10 PRIMARY HYPERTENSION: ICD-10-CM

## 2025-01-06 RX ORDER — LOSARTAN POTASSIUM 50 MG/1
50 TABLET ORAL 2 TIMES DAILY
Qty: 180 TABLET | Refills: 3 | OUTPATIENT
Start: 2025-01-06

## 2025-01-06 NOTE — TELEPHONE ENCOUNTER
Medication Question or Refill    Contacts       Contact Date/Time Type Contact Phone/Fax    01/06/2025 12:19 PM CST Phone (Incoming) Lorna Muse (Self) [phone] (M)            What medication are you calling about (include dose and sig)?: losartan (COZAAR) 50 MG tablet     Preferred Pharmacy:   [place] [id number] [place] [address]  [address] [facility] [address]  Phone: [phone] Fax: [phone]      Controlled Substance Agreement on file:   CSA -- Patient Level:    CSA: None found at the patient level.       Who prescribed the medication?: Dr. Mg Aguiar    Do you need a refill? Yes    When did you use the medication last? 1.6.25    Patient offered an appointment? No    Do you have any questions or concerns?  No      Okay to leave a detailed message?: Yes at Cell number on file:    Telephone Information:   Mobile [phone]